# Patient Record
Sex: FEMALE | Race: WHITE | NOT HISPANIC OR LATINO | Employment: OTHER | URBAN - METROPOLITAN AREA
[De-identification: names, ages, dates, MRNs, and addresses within clinical notes are randomized per-mention and may not be internally consistent; named-entity substitution may affect disease eponyms.]

---

## 2023-09-21 LAB — URINE CULTURE: NORMAL

## 2023-11-14 ENCOUNTER — OFFICE VISIT (OUTPATIENT)
Dept: PRIMARY CARE | Facility: CLINIC | Age: 78
End: 2023-11-14
Payer: MEDICARE

## 2023-11-14 VITALS
SYSTOLIC BLOOD PRESSURE: 130 MMHG | DIASTOLIC BLOOD PRESSURE: 54 MMHG | HEIGHT: 62 IN | HEART RATE: 96 BPM | RESPIRATION RATE: 16 BRPM | OXYGEN SATURATION: 98 % | WEIGHT: 135.8 LBS | BODY MASS INDEX: 24.99 KG/M2

## 2023-11-14 DIAGNOSIS — J45.21 MILD INTERMITTENT REACTIVE AIRWAY DISEASE WITH ACUTE EXACERBATION (HHS-HCC): Primary | ICD-10-CM

## 2023-11-14 DIAGNOSIS — J20.8 ACUTE BRONCHITIS DUE TO OTHER SPECIFIED ORGANISMS: ICD-10-CM

## 2023-11-14 PROCEDURE — 99213 OFFICE O/P EST LOW 20 MIN: CPT | Performed by: INTERNAL MEDICINE

## 2023-11-14 PROCEDURE — 1159F MED LIST DOCD IN RCRD: CPT | Performed by: INTERNAL MEDICINE

## 2023-11-14 PROCEDURE — 1125F AMNT PAIN NOTED PAIN PRSNT: CPT | Performed by: INTERNAL MEDICINE

## 2023-11-14 RX ORDER — DOXYCYCLINE 100 MG/1
CAPSULE ORAL
Qty: 20 CAPSULE | Refills: 0 | Status: SHIPPED | OUTPATIENT
Start: 2023-11-14 | End: 2024-01-15 | Stop reason: ALTCHOICE

## 2023-11-14 RX ORDER — GABAPENTIN 100 MG/1
1 CAPSULE ORAL DAILY PRN
COMMUNITY
Start: 2018-06-04 | End: 2024-04-16

## 2023-11-14 RX ORDER — FLUTICASONE PROPIONATE AND SALMETEROL 250; 50 UG/1; UG/1
1 POWDER RESPIRATORY (INHALATION)
Qty: 60 EACH | Refills: 11 | Status: SHIPPED | OUTPATIENT
Start: 2023-11-14 | End: 2024-05-21 | Stop reason: ALTCHOICE

## 2023-11-14 RX ORDER — VIT C/E/ZN/COPPR/LUTEIN/ZEAXAN 250MG-90MG
1000 CAPSULE ORAL
COMMUNITY

## 2023-11-14 RX ORDER — AMLODIPINE BESYLATE 2.5 MG/1
TABLET ORAL
COMMUNITY
Start: 2019-07-01 | End: 2024-03-28

## 2023-11-14 ASSESSMENT — PATIENT HEALTH QUESTIONNAIRE - PHQ9
2. FEELING DOWN, DEPRESSED OR HOPELESS: NOT AT ALL
1. LITTLE INTEREST OR PLEASURE IN DOING THINGS: NOT AT ALL
SUM OF ALL RESPONSES TO PHQ9 QUESTIONS 1 AND 2: 0

## 2023-11-14 ASSESSMENT — ENCOUNTER SYMPTOMS
TROUBLE SWALLOWING: 0
COUGH: 1
NAUSEA: 0
CHILLS: 0
VOMITING: 0
RHINORRHEA: 1
FATIGUE: 0
DEPRESSION: 0
VOICE CHANGE: 1
EYE PAIN: 0
DIARRHEA: 0
ABDOMINAL PAIN: 0
LOSS OF SENSATION IN FEET: 0
FEVER: 0
EYE DISCHARGE: 0
SORE THROAT: 1
SHORTNESS OF BREATH: 1
CONSTIPATION: 0
SINUS PAIN: 1
OCCASIONAL FEELINGS OF UNSTEADINESS: 0

## 2023-11-14 ASSESSMENT — COLUMBIA-SUICIDE SEVERITY RATING SCALE - C-SSRS
6. HAVE YOU EVER DONE ANYTHING, STARTED TO DO ANYTHING, OR PREPARED TO DO ANYTHING TO END YOUR LIFE?: NO
1. IN THE PAST MONTH, HAVE YOU WISHED YOU WERE DEAD OR WISHED YOU COULD GO TO SLEEP AND NOT WAKE UP?: NO
2. HAVE YOU ACTUALLY HAD ANY THOUGHTS OF KILLING YOURSELF?: NO

## 2023-11-14 NOTE — PROGRESS NOTES
"Subjective   Patient ID: Donna Vidales is a 78 y.o. female who presents for Nasal Congestion and Cough.    Cough  Associated symptoms include postnasal drip, rhinorrhea, a sore throat and shortness of breath. Pertinent negatives include no chest pain, chills, ear pain or fever.      #Nasal congestion and cough  - Cough and runny nose started two weeks ago, has not stopped since then  - Green mucus and productive cough  - Tested herself for COVID-19 two weeks ago and it was negative, did not test again  - Has not taken medication for relief as pt does not know what to take  - Has not done steam inhalation for relief  - Chamomile tea she drinks at night, does not help  - Denies sick contacts, no recent travel  - Has not gotten the recent COVID booster or flu vaccine  - Pt has been living with grandson's dog for past three months  - Pt also has been raking leaves recently    Review of Systems   Constitutional:  Negative for chills, fatigue and fever.   HENT:  Positive for congestion, postnasal drip, rhinorrhea, sinus pain, sore throat and voice change. Negative for ear pain and trouble swallowing.    Eyes:  Negative for pain and discharge.   Respiratory:  Positive for cough and shortness of breath.    Cardiovascular:  Negative for chest pain.   Gastrointestinal:  Negative for abdominal pain, constipation, diarrhea, nausea and vomiting.       Objective   /54 (BP Location: Right arm, Patient Position: Sitting, BP Cuff Size: Adult)   Pulse 96   Resp 16   Ht 1.575 m (5' 2\")   Wt 61.6 kg (135 lb 12.9 oz)   SpO2 98%   BMI 24.84 kg/m²     Physical Exam  General- non toxic, no respiratory distress  Eyes- Conjunctiva clear, PERRLA  Ears- TMs clear, no erythema, no fluid, TMs not retracted or bulging  Nose- external normal, turbinates free of swelling, erythema, and drainage, sinus tenderness  Throat- mucus membranes moist, erythema present, no sinus tract drainage, no exudates or lesions  Neck- slightly tender, no " enlarged cervical lymphadenopathy  Cardiac- regular rate and rhythm, no murmurs, no gallop or rubs  Lung- prolonged end expiratory phase, no rales, no rhonchi, no wheezing  GI- normally active bowel sounds, nontender, nondistended, no organomegaly, no guarding  skin no new lesions  Extremities- no edema  Neuro- non focal, oriented x 3  Psychiatric- no hallucinations    Assessment/Plan   Donna Vidales is a 78 y.o. female who presents for Nasal Congestion and Cough. Of note, the pt was recently seen in clinic in September 2023 for similar symptoms and was diagnosed with acute bronchitis and mild intermittent reactive airway disease. At this point, she was prescribed SMX-TMP, which she reports taking the full course of. She also was also started on fluticasone propion-salmeterol 250-50mcg. She reports that she does not know how to use it, so she has not been using it. Plan is as follows:    #Mild intermittent reactive airway disease with acute exacerbation  - Potential triggers include new household pet dog and raking leaves  - Pt to use fluticasone propion-Salmeterol 250-50 mcg scheduled with 1 puff 2x daily    #Acute Bronchitis  - Doxycycline 10 mg for 10 days BID    Please follow up in the next week if SOB does not improve.    Problem List Items Addressed This Visit    None  Visit Diagnoses         Codes    Mild intermittent reactive airway disease with acute exacerbation    -  Primary J45.21    Relevant Medications    fluticasone propion-salmeteroL (Advair Diskus) 250-50 mcg/dose diskus inhaler    Acute bronchitis due to other specified organisms     J20.8    Relevant Medications    doxycycline (Vibramycin) 100 mg capsule            Jaquelin Jordan, MS3  [employer] [facility]

## 2023-11-14 NOTE — PATIENT INSTRUCTIONS
Please return to the office in one week from today if you still feel shortness of breath. Complete flu shot when you finish your medications.

## 2023-11-20 ENCOUNTER — TELEPHONE (OUTPATIENT)
Dept: PRIMARY CARE | Facility: CLINIC | Age: 78
End: 2023-11-20
Payer: MEDICARE

## 2023-11-20 NOTE — TELEPHONE ENCOUNTER
Went over with pt     ----- Message from Brianna Arguello MD sent at 11/20/2023  2:11 PM EST -----  Since she has remote hx of intolernace to steroid I did not sent medrol pack. Just call her, tell her to continue current meds, also use face steam and claritin d or allegra d for 1 week and observe how she is feeling next week  ----- Message -----  From: Eloise Hilliard MA  Sent: 11/20/2023   2:07 PM EST  To: Brianna Arguello MD    ? Allergy   ----- Message -----  From: Brianna Arguello MD  Sent: 11/20/2023   1:08 PM EST  To: Eloise Hilliard MA    Unless she has intolerance to doxicicline she should finish, but I also want her to add medrol pack that I sent to pharmacy, and continue oral inhaler . She needs to give herselft at least one more week to feel improvement  ----- Message -----  From: Eloise Hilliard MA  Sent: 11/20/2023  11:58 AM EST  To: Brianna Arguello MD    Pt states that she is still having very bad sinus issues and headaches she is taking the the other medication but it is not helping would like something else

## 2023-12-20 ENCOUNTER — APPOINTMENT (OUTPATIENT)
Dept: PRIMARY CARE | Facility: CLINIC | Age: 78
End: 2023-12-20
Payer: MEDICARE

## 2023-12-21 ENCOUNTER — LAB REQUISITION (OUTPATIENT)
Dept: LAB | Facility: HOSPITAL | Age: 78
End: 2023-12-21
Payer: MEDICARE

## 2023-12-21 DIAGNOSIS — J45.998 OTHER ASTHMA (HHS-HCC): ICD-10-CM

## 2023-12-21 PROCEDURE — 87651 STREP A DNA AMP PROBE: CPT

## 2023-12-22 LAB — S PYO DNA THROAT QL NAA+PROBE: NOT DETECTED

## 2024-01-03 ENCOUNTER — APPOINTMENT (OUTPATIENT)
Dept: PRIMARY CARE | Facility: CLINIC | Age: 79
End: 2024-01-03
Payer: MEDICARE

## 2024-01-15 ENCOUNTER — LAB (OUTPATIENT)
Dept: LAB | Facility: LAB | Age: 79
End: 2024-01-15
Payer: MEDICARE

## 2024-01-15 ENCOUNTER — OFFICE VISIT (OUTPATIENT)
Dept: PRIMARY CARE | Facility: CLINIC | Age: 79
End: 2024-01-15
Payer: MEDICARE

## 2024-01-15 VITALS
HEART RATE: 66 BPM | BODY MASS INDEX: 25.58 KG/M2 | WEIGHT: 139 LBS | DIASTOLIC BLOOD PRESSURE: 64 MMHG | RESPIRATION RATE: 15 BRPM | HEIGHT: 62 IN | OXYGEN SATURATION: 99 % | SYSTOLIC BLOOD PRESSURE: 130 MMHG

## 2024-01-15 DIAGNOSIS — Z00.00 WELLNESS EXAMINATION: Primary | ICD-10-CM

## 2024-01-15 DIAGNOSIS — K21.00 GASTROESOPHAGEAL REFLUX DISEASE WITH ESOPHAGITIS WITHOUT HEMORRHAGE: ICD-10-CM

## 2024-01-15 DIAGNOSIS — Z12.31 VISIT FOR SCREENING MAMMOGRAM: ICD-10-CM

## 2024-01-15 DIAGNOSIS — R73.9 HYPERGLYCEMIA: ICD-10-CM

## 2024-01-15 DIAGNOSIS — I10 PRIMARY HYPERTENSION: ICD-10-CM

## 2024-01-15 DIAGNOSIS — Z00.00 PHYSICAL EXAM, ANNUAL: ICD-10-CM

## 2024-01-15 DIAGNOSIS — M48.061 SPINAL STENOSIS OF LUMBAR REGION WITHOUT NEUROGENIC CLAUDICATION: ICD-10-CM

## 2024-01-15 DIAGNOSIS — E55.9 VITAMIN D DEFICIENCY: ICD-10-CM

## 2024-01-15 LAB
25(OH)D3 SERPL-MCNC: 28 NG/ML (ref 30–100)
ALBUMIN SERPL BCP-MCNC: 4.5 G/DL (ref 3.4–5)
ALP SERPL-CCNC: 59 U/L (ref 33–136)
ALT SERPL W P-5'-P-CCNC: 19 U/L (ref 7–45)
ANION GAP SERPL CALC-SCNC: 12 MMOL/L (ref 10–20)
AST SERPL W P-5'-P-CCNC: 20 U/L (ref 9–39)
BILIRUB SERPL-MCNC: 0.7 MG/DL (ref 0–1.2)
BUN SERPL-MCNC: 10 MG/DL (ref 6–23)
CALCIUM SERPL-MCNC: 9.5 MG/DL (ref 8.6–10.6)
CHLORIDE SERPL-SCNC: 103 MMOL/L (ref 98–107)
CHOLEST SERPL-MCNC: 181 MG/DL (ref 0–199)
CHOLESTEROL/HDL RATIO: 2.7
CO2 SERPL-SCNC: 30 MMOL/L (ref 21–32)
CREAT SERPL-MCNC: 0.6 MG/DL (ref 0.5–1.05)
EGFRCR SERPLBLD CKD-EPI 2021: >90 ML/MIN/1.73M*2
ERYTHROCYTE [DISTWIDTH] IN BLOOD BY AUTOMATED COUNT: 13.4 % (ref 11.5–14.5)
EST. AVERAGE GLUCOSE BLD GHB EST-MCNC: 126 MG/DL
GLUCOSE SERPL-MCNC: 109 MG/DL (ref 74–99)
HBA1C MFR BLD: 6 %
HCT VFR BLD AUTO: 39.2 % (ref 36–46)
HDLC SERPL-MCNC: 66.9 MG/DL
HGB BLD-MCNC: 13 G/DL (ref 12–16)
LDLC SERPL CALC-MCNC: 86 MG/DL
MCH RBC QN AUTO: 28.6 PG (ref 26–34)
MCHC RBC AUTO-ENTMCNC: 33.2 G/DL (ref 32–36)
MCV RBC AUTO: 86 FL (ref 80–100)
NON HDL CHOLESTEROL: 114 MG/DL (ref 0–149)
NRBC BLD-RTO: 0 /100 WBCS (ref 0–0)
PLATELET # BLD AUTO: 308 X10*3/UL (ref 150–450)
POTASSIUM SERPL-SCNC: 4.2 MMOL/L (ref 3.5–5.3)
PROT SERPL-MCNC: 6.9 G/DL (ref 6.4–8.2)
RBC # BLD AUTO: 4.54 X10*6/UL (ref 4–5.2)
SODIUM SERPL-SCNC: 141 MMOL/L (ref 136–145)
TRIGL SERPL-MCNC: 143 MG/DL (ref 0–149)
TSH SERPL-ACNC: 1.28 MIU/L (ref 0.44–3.98)
VLDL: 29 MG/DL (ref 0–40)
WBC # BLD AUTO: 5.3 X10*3/UL (ref 4.4–11.3)

## 2024-01-15 PROCEDURE — 80053 COMPREHEN METABOLIC PANEL: CPT

## 2024-01-15 PROCEDURE — G0439 PPPS, SUBSEQ VISIT: HCPCS | Performed by: INTERNAL MEDICINE

## 2024-01-15 PROCEDURE — 1125F AMNT PAIN NOTED PAIN PRSNT: CPT | Performed by: INTERNAL MEDICINE

## 2024-01-15 PROCEDURE — 80061 LIPID PANEL: CPT

## 2024-01-15 PROCEDURE — 82306 VITAMIN D 25 HYDROXY: CPT

## 2024-01-15 PROCEDURE — 83036 HEMOGLOBIN GLYCOSYLATED A1C: CPT

## 2024-01-15 PROCEDURE — 1170F FXNL STATUS ASSESSED: CPT | Performed by: INTERNAL MEDICINE

## 2024-01-15 PROCEDURE — 99397 PER PM REEVAL EST PAT 65+ YR: CPT | Performed by: INTERNAL MEDICINE

## 2024-01-15 PROCEDURE — 3075F SYST BP GE 130 - 139MM HG: CPT | Performed by: INTERNAL MEDICINE

## 2024-01-15 PROCEDURE — 84443 ASSAY THYROID STIM HORMONE: CPT

## 2024-01-15 PROCEDURE — 3078F DIAST BP <80 MM HG: CPT | Performed by: INTERNAL MEDICINE

## 2024-01-15 PROCEDURE — 36415 COLL VENOUS BLD VENIPUNCTURE: CPT

## 2024-01-15 PROCEDURE — 85027 COMPLETE CBC AUTOMATED: CPT

## 2024-01-15 RX ORDER — ALBUTEROL SULFATE 90 UG/1
2 AEROSOL, METERED RESPIRATORY (INHALATION) 4 TIMES DAILY PRN
COMMUNITY
Start: 2023-12-21

## 2024-01-15 RX ORDER — IBUPROFEN 100 MG/5ML
1000 SUSPENSION, ORAL (FINAL DOSE FORM) ORAL DAILY
COMMUNITY

## 2024-01-15 ASSESSMENT — ACTIVITIES OF DAILY LIVING (ADL)
TAKING_MEDICATION: INDEPENDENT
BATHING: INDEPENDENT
MANAGING_FINANCES: INDEPENDENT
DOING_HOUSEWORK: INDEPENDENT
DRESSING: INDEPENDENT
GROCERY_SHOPPING: INDEPENDENT

## 2024-01-15 ASSESSMENT — COLUMBIA-SUICIDE SEVERITY RATING SCALE - C-SSRS
1. IN THE PAST MONTH, HAVE YOU WISHED YOU WERE DEAD OR WISHED YOU COULD GO TO SLEEP AND NOT WAKE UP?: NO
2. HAVE YOU ACTUALLY HAD ANY THOUGHTS OF KILLING YOURSELF?: NO
6. HAVE YOU EVER DONE ANYTHING, STARTED TO DO ANYTHING, OR PREPARED TO DO ANYTHING TO END YOUR LIFE?: NO

## 2024-01-15 ASSESSMENT — ENCOUNTER SYMPTOMS
CHEST TIGHTNESS: 0
PALPITATIONS: 0
SLEEP DISTURBANCE: 0
DEPRESSION: 0
OCCASIONAL FEELINGS OF UNSTEADINESS: 0
LOSS OF SENSATION IN FEET: 0
NERVOUS/ANXIOUS: 0
LIGHT-HEADEDNESS: 0

## 2024-01-15 NOTE — PROGRESS NOTES
"Subjective   Reason for Visit: Donna Vidales is an 79 y.o. female here for a Medicare Wellness visit.               HPI Pt recovered from  acute bronchitis, after 2 rounds of atb and 2 rounds of steroids oral . She is not back to normal.  Two large meals for breakfast  toast and coffee, and dinner : any kind of protein starches,  and fresh produce , for lunch one egg or vegetables, cheese or pizza. 3 servings of fresh produce. Sweets small amount daily. ETOH home wine every night..  She is doing home physical therapy for knees and lower spine, her bilateral calf pain, and it works. Knee pain worse at night, uses local voltaren or oral advil. PRN , as well gabapentin 100 at bedtime.  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  Heartburn asymptomatic with no meds.    Patient Care Team:  Brianna Arguello MD as PCP - General  Brianna Arguello MD as PCP - [employer] PCP     Review of Systems   HENT:          Rhinorrea , triggered by weather chagnes   Respiratory:  Negative for chest tightness.    Cardiovascular:  Negative for palpitations and leg swelling.   Neurological:  Negative for light-headedness.   Psychiatric/Behavioral:  Negative for sleep disturbance. The patient is not nervous/anxious.    All other systems reviewed and are negative.      Objective   Vitals:  /64 (BP Location: Left arm, Patient Position: Sitting, BP Cuff Size: Adult)   Pulse 66   Resp 15   Ht 1.575 m (5' 2\")   Wt 63 kg (139 lb)   SpO2 99%   BMI 25.42 kg/m²       Physical Exam  Eyes - conjunctiva clear, PERRLA  HEENT - no impacted wax. Minimal sinus tenderness.  Neck - No cervical lymphadenopathy, no thyromegaly  Axilla - no palpable lymphadenopathy  Breast - visual exam: no asymmetry; palpation: nontender, no palpable nodules, retractions or discharge bilaterally  Cardiac - regular rate and rhythm, no murmurs, no carotid bruit, no JVP  Lung- clear to " auscultation, no rales, no rhonchi, no wheezing  GI- normally active bowel sounds, nontender, nondistended, no hepatosplenomegaly, no rebound  MSK - no deformities, bilateral knee mild effusion, ROM preserve  Neuro - non focal, oriented x 3  Extremities - no edema, multiple superficial varicose veins in proximal and distal lower extremities, good distal arterial pulses  Skin - no rash  Psychiatric - pleasant, cooperative, no hallucinations    Assessment/Plan   Problem List Items Addressed This Visit          Cardiac and Vasculature    Primary hypertension    Relevant Orders    Comprehensive Metabolic Panel    Lipid Panel       Health Encounters    Wellness examination - Primary    Physical exam, annual     Other Visit Diagnoses       Age related osteoporosis, unspecified pathological fracture presence        Hyperglycemia        Relevant Orders    Hemoglobin A1C    TSH with reflex to Free T4 if abnormal    Gastroesophageal reflux disease with esophagitis without hemorrhage        Relevant Orders    CBC    Vitamin D deficiency        Relevant Orders    Vitamin D 25-Hydroxy,Total (for eval of Vitamin D levels)    Essential (primary) hypertension        Relevant Orders    TSH with reflex to Free T4 if abnormal

## 2024-01-15 NOTE — ASSESSMENT & PLAN NOTE
Discussed diet, exercise, immunizations, and screening appropriate for their age.  Colonoscopy: 2022 no longer  Dexa: borderline normal low.  Mammogram: 2021.

## 2024-01-15 NOTE — PATIENT INSTRUCTIONS
Please continue healthy life style. You may increase gabapentin to  200 mg if pain in joints and legs do not improve in [place].  Complete soon shingrex #1 and 2 months later shingrex #2.Maintain some weight bearing exercise . Return here in May. Complete soon mammogram.

## 2024-01-23 ENCOUNTER — TELEPHONE (OUTPATIENT)
Dept: PRIMARY CARE | Facility: CLINIC | Age: 79
End: 2024-01-23
Payer: MEDICARE

## 2024-03-28 DIAGNOSIS — I10 PRIMARY HYPERTENSION: ICD-10-CM

## 2024-03-28 RX ORDER — AMLODIPINE BESYLATE 2.5 MG/1
TABLET ORAL
Qty: 160 TABLET | Refills: 1 | Status: SHIPPED | OUTPATIENT
Start: 2024-03-28 | End: 2024-05-06 | Stop reason: SDUPTHER

## 2024-04-15 DIAGNOSIS — M48.061 SPINAL STENOSIS OF LUMBAR REGION WITHOUT NEUROGENIC CLAUDICATION: Primary | ICD-10-CM

## 2024-04-16 RX ORDER — GABAPENTIN 100 MG/1
CAPSULE ORAL
Qty: 80 CAPSULE | Refills: 0 | Status: SHIPPED | OUTPATIENT
Start: 2024-04-16

## 2024-05-06 DIAGNOSIS — I10 PRIMARY HYPERTENSION: ICD-10-CM

## 2024-05-06 RX ORDER — AMLODIPINE BESYLATE 2.5 MG/1
TABLET ORAL
Qty: 180 TABLET | Refills: 1 | Status: SHIPPED | OUTPATIENT
Start: 2024-05-06

## 2024-05-21 ENCOUNTER — OFFICE VISIT (OUTPATIENT)
Dept: PRIMARY CARE | Facility: CLINIC | Age: 79
End: 2024-05-21
Payer: MEDICARE

## 2024-05-21 VITALS
WEIGHT: 137.13 LBS | SYSTOLIC BLOOD PRESSURE: 134 MMHG | OXYGEN SATURATION: 97 % | BODY MASS INDEX: 25.23 KG/M2 | DIASTOLIC BLOOD PRESSURE: 72 MMHG | RESPIRATION RATE: 16 BRPM | HEIGHT: 62 IN | HEART RATE: 74 BPM | TEMPERATURE: 97.5 F

## 2024-05-21 DIAGNOSIS — I10 PRIMARY HYPERTENSION: Primary | ICD-10-CM

## 2024-05-21 DIAGNOSIS — E55.9 VITAMIN D DEFICIENCY, UNSPECIFIED: ICD-10-CM

## 2024-05-21 DIAGNOSIS — M25.50 DIFFUSE ARTHRALGIA: ICD-10-CM

## 2024-05-21 PROCEDURE — 3078F DIAST BP <80 MM HG: CPT | Performed by: INTERNAL MEDICINE

## 2024-05-21 PROCEDURE — 99213 OFFICE O/P EST LOW 20 MIN: CPT | Performed by: INTERNAL MEDICINE

## 2024-05-21 PROCEDURE — 3075F SYST BP GE 130 - 139MM HG: CPT | Performed by: INTERNAL MEDICINE

## 2024-05-21 PROCEDURE — 1036F TOBACCO NON-USER: CPT | Performed by: INTERNAL MEDICINE

## 2024-05-21 ASSESSMENT — ENCOUNTER SYMPTOMS
CHILLS: 0
SINUS PAIN: 0
FEVER: 0
DEPRESSION: 0
LOSS OF SENSATION IN FEET: 0
OCCASIONAL FEELINGS OF UNSTEADINESS: 0

## 2024-05-21 ASSESSMENT — PATIENT HEALTH QUESTIONNAIRE - PHQ9
2. FEELING DOWN, DEPRESSED OR HOPELESS: NOT AT ALL
SUM OF ALL RESPONSES TO PHQ9 QUESTIONS 1 AND 2: 0
1. LITTLE INTEREST OR PLEASURE IN DOING THINGS: NOT AT ALL

## 2024-05-21 ASSESSMENT — COLUMBIA-SUICIDE SEVERITY RATING SCALE - C-SSRS
2. HAVE YOU ACTUALLY HAD ANY THOUGHTS OF KILLING YOURSELF?: NO
1. IN THE PAST MONTH, HAVE YOU WISHED YOU WERE DEAD OR WISHED YOU COULD GO TO SLEEP AND NOT WAKE UP?: NO
6. HAVE YOU EVER DONE ANYTHING, STARTED TO DO ANYTHING, OR PREPARED TO DO ANYTHING TO END YOUR LIFE?: NO

## 2024-05-21 NOTE — PROGRESS NOTES
"Subjective   Patient ID: Donna Vidales is a 79 y.o. female who presents for Hypertension. Aches  Lab results from [place]     She return 3 w ago from [place], her life style continues similar. She did not increase gabapentin in [place] due to dry mouth, only takes 100 mg capsule.   For pain uses PRN acetaminophen or aleeve, when humidity is high.  Good compliance with bp meds. At home god numbers systolics 120 ' diastolics 60.  She went to urgent care in [place]iness in [place], head ct completed still pending.   She was prescribed on April ,for sinus infection,  Augmentin, unable to tolerate due to upset stomach,, no rash,   She increased sporadically her vitamin d    Review of Systems   Constitutional:  Negative for chills and fever.   HENT:  Negative for ear pain and sinus pain.    All other systems reviewed and are negative.      Objective   /72 (BP Location: Left arm, Patient Position: Sitting, BP Cuff Size: Adult)   Pulse 74   Temp 36.4 °C (97.5 °F)   Resp 16   Ht 1.575 m (5' 2\")   Wt 62.2 kg (137 lb 2 oz)   SpO2 97%   BMI 25.08 kg/m²     Physical Exam  Eyes- Conjunctiva clear  Neck-no thyromegaly  Cardiac- regular rate and rhythm, no murmurs  Lung- clear to auscultation  GI- present  bowel sounds, nontender, no rebound  MSK- no deformities  Extremities- no edema, good distal pulses  Neuro- non focal, oriented x 3  Psychiatric- pleasant, well groomed, no hallucinations    Assessment/Plan   Problem List Items Addressed This Visit             ICD-10-CM    Primary hypertension - Primary I10     Well control         Vitamin D deficiency, unspecified E55.9     Advice to take daily dose of d,          Diffuse arthralgia M25.50     Discussed pain control               "

## 2024-05-21 NOTE — PATIENT INSTRUCTIONS
Maintain 2 capsules of vitamiin d3. Take daily acetaminophen is safe in emtpy stomach, you may take advil with food as need it, return in october

## 2024-07-14 DIAGNOSIS — I10 PRIMARY HYPERTENSION: ICD-10-CM

## 2024-07-15 RX ORDER — AMLODIPINE BESYLATE 2.5 MG/1
TABLET ORAL
Qty: 200 TABLET | Refills: 1 | Status: SHIPPED | OUTPATIENT
Start: 2024-07-15

## 2024-08-28 ENCOUNTER — TELEPHONE (OUTPATIENT)
Dept: PRIMARY CARE | Facility: CLINIC | Age: 79
End: 2024-08-28
Payer: MEDICARE

## 2024-08-29 ENCOUNTER — OFFICE VISIT (OUTPATIENT)
Dept: PRIMARY CARE | Facility: CLINIC | Age: 79
End: 2024-08-29
Payer: MEDICARE

## 2024-08-29 VITALS
RESPIRATION RATE: 18 BRPM | WEIGHT: 136 LBS | HEART RATE: 73 BPM | HEIGHT: 62 IN | DIASTOLIC BLOOD PRESSURE: 72 MMHG | SYSTOLIC BLOOD PRESSURE: 124 MMHG | OXYGEN SATURATION: 96 % | BODY MASS INDEX: 25.03 KG/M2

## 2024-08-29 DIAGNOSIS — C67.5 MALIGNANT NEOPLASM OF URINARY BLADDER NECK (MULTI): ICD-10-CM

## 2024-08-29 DIAGNOSIS — F41.8 DEPRESSION WITH ANXIETY: Primary | ICD-10-CM

## 2024-08-29 DIAGNOSIS — M48.061 SPINAL STENOSIS OF LUMBAR REGION WITHOUT NEUROGENIC CLAUDICATION: ICD-10-CM

## 2024-08-29 DIAGNOSIS — I10 PRIMARY HYPERTENSION: ICD-10-CM

## 2024-08-29 DIAGNOSIS — G47.01 INSOMNIA DUE TO MEDICAL CONDITION: ICD-10-CM

## 2024-08-29 DIAGNOSIS — M15.9 PRIMARY OSTEOARTHRITIS INVOLVING MULTIPLE JOINTS: ICD-10-CM

## 2024-08-29 PROBLEM — M15.0 PRIMARY OSTEOARTHRITIS INVOLVING MULTIPLE JOINTS: Status: ACTIVE | Noted: 2024-08-29

## 2024-08-29 PROCEDURE — 1036F TOBACCO NON-USER: CPT | Performed by: INTERNAL MEDICINE

## 2024-08-29 PROCEDURE — 3074F SYST BP LT 130 MM HG: CPT | Performed by: INTERNAL MEDICINE

## 2024-08-29 PROCEDURE — 99214 OFFICE O/P EST MOD 30 MIN: CPT | Performed by: INTERNAL MEDICINE

## 2024-08-29 PROCEDURE — 3078F DIAST BP <80 MM HG: CPT | Performed by: INTERNAL MEDICINE

## 2024-08-29 PROCEDURE — 1159F MED LIST DOCD IN RCRD: CPT | Performed by: INTERNAL MEDICINE

## 2024-08-29 RX ORDER — ASCORBIC ACID 125 MG
250 TABLET,CHEWABLE ORAL NIGHTLY
COMMUNITY

## 2024-08-29 RX ORDER — DULOXETIN HYDROCHLORIDE 20 MG/1
CAPSULE, DELAYED RELEASE ORAL
Qty: 30 CAPSULE | Refills: 1 | Status: SHIPPED | OUTPATIENT
Start: 2024-08-29

## 2024-08-29 ASSESSMENT — ENCOUNTER SYMPTOMS
ABDOMINAL DISTENTION: 0
DIAPHORESIS: 0

## 2024-08-29 NOTE — PATIENT INSTRUCTIONS
Please take one tylenol artritis every morning along with new medication duloxetine 20 mg and am as well. . Night time take one tylenol PM along with one gabapentin and one magnesium.Re start lower back physical therapy at home and continue walking more frequent. Return in 1 m

## 2024-08-29 NOTE — PROGRESS NOTES
"Subjective   Patient ID: Donna Vidales is a 79 y.o. female who presents for Arthritis (Arthritis pain).     She feels exacerbation of intense pain and stiffness in hands, knees, lower back for about 1 month during the night mostly after resting for few hours, she needs to get up and walk, heats and hot showers help, She is very fatigue and tired during the day due to lack of sleep.   She eats because she has too.    She takes PRN acetaminophen 650 mg at bedtime. Day time she takes none.   has lost his sights, she is very stress since she is doing all her house hold care, drives  , prepare their meds .     Review of Systems   Constitutional:  Negative for diaphoresis.   Gastrointestinal:  Negative for abdominal distention.   Neurological:         Concern for short memory lapses, re eval next month   All other systems reviewed and are negative.      Objective   /72 (BP Location: Right arm, Patient Position: Sitting)   Pulse 73   Resp 18   Ht 1.575 m (5' 2\")   Wt 61.7 kg (136 lb)   SpO2 96%   BMI 24.87 kg/m²     Physical Exam  Weight stable  Eyes- Conjunctiva clear  Neck-no thyromegaly  Cardiac- regular rate and rhythm, no murmurs  Lung- clear to auscultation  GI- present  bowel sounds, nontender, no rebound  MSK- no deformities  Extremities- no edema, good distal pulses  Neuro- non focal, oriented x 3  Psychiatric- pleasant, well groomed, no hallucinations    Assessment/Plan   Problem List Items Addressed This Visit             ICD-10-CM    Spinal stenosis of lumbar region without neurogenic claudication M48.061     Advice home P therapy         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 Next visit may increase gabapentin if necessary.

## 2024-08-29 NOTE — ASSESSMENT & PLAN NOTE
Discussed pain control, she wants to avoid nsaids due to her hx of GERD. Next visit may increase gabapentin if necessary.

## 2024-08-29 NOTE — ASSESSMENT & PLAN NOTE
Care giver stress, chronic pain, severe insomnia, discussed side effects and benefits of duloxetine re dwayne in 1 m

## 2024-09-16 ENCOUNTER — TELEPHONE (OUTPATIENT)
Dept: PRIMARY CARE | Facility: CLINIC | Age: 79
End: 2024-09-16
Payer: MEDICARE

## 2024-09-17 ENCOUNTER — OFFICE VISIT (OUTPATIENT)
Dept: PRIMARY CARE | Facility: CLINIC | Age: 79
End: 2024-09-17
Payer: MEDICARE

## 2024-09-17 VITALS
RESPIRATION RATE: 16 BRPM | HEART RATE: 73 BPM | DIASTOLIC BLOOD PRESSURE: 64 MMHG | WEIGHT: 137.13 LBS | HEIGHT: 62 IN | TEMPERATURE: 96.6 F | OXYGEN SATURATION: 99 % | BODY MASS INDEX: 25.23 KG/M2 | SYSTOLIC BLOOD PRESSURE: 108 MMHG

## 2024-09-17 DIAGNOSIS — K11.20 PAROTIDITIS: Primary | ICD-10-CM

## 2024-09-17 DIAGNOSIS — I10 PRIMARY HYPERTENSION: ICD-10-CM

## 2024-09-17 PROCEDURE — 1036F TOBACCO NON-USER: CPT | Performed by: INTERNAL MEDICINE

## 2024-09-17 PROCEDURE — 99213 OFFICE O/P EST LOW 20 MIN: CPT | Performed by: INTERNAL MEDICINE

## 2024-09-17 PROCEDURE — 3078F DIAST BP <80 MM HG: CPT | Performed by: INTERNAL MEDICINE

## 2024-09-17 PROCEDURE — 3074F SYST BP LT 130 MM HG: CPT | Performed by: INTERNAL MEDICINE

## 2024-09-17 PROCEDURE — 1159F MED LIST DOCD IN RCRD: CPT | Performed by: INTERNAL MEDICINE

## 2024-09-17 RX ORDER — DOXYCYCLINE 100 MG/1
100 CAPSULE ORAL 2 TIMES DAILY
Qty: 14 CAPSULE | Refills: 0 | Status: SHIPPED | OUTPATIENT
Start: 2024-09-17 | End: 2024-09-24

## 2024-09-17 ASSESSMENT — PATIENT HEALTH QUESTIONNAIRE - PHQ9
SUM OF ALL RESPONSES TO PHQ9 QUESTIONS 1 AND 2: 0
2. FEELING DOWN, DEPRESSED OR HOPELESS: NOT AT ALL
1. LITTLE INTEREST OR PLEASURE IN DOING THINGS: NOT AT ALL

## 2024-09-17 ASSESSMENT — ENCOUNTER SYMPTOMS
LOSS OF SENSATION IN FEET: 0
DEPRESSION: 0
HEMATURIA: 0
FEVER: 0
OCCASIONAL FEELINGS OF UNSTEADINESS: 0
DYSURIA: 0

## 2024-09-17 ASSESSMENT — COLUMBIA-SUICIDE SEVERITY RATING SCALE - C-SSRS
6. HAVE YOU EVER DONE ANYTHING, STARTED TO DO ANYTHING, OR PREPARED TO DO ANYTHING TO END YOUR LIFE?: NO
2. HAVE YOU ACTUALLY HAD ANY THOUGHTS OF KILLING YOURSELF?: NO
1. IN THE PAST MONTH, HAVE YOU WISHED YOU WERE DEAD OR WISHED YOU COULD GO TO SLEEP AND NOT WAKE UP?: NO

## 2024-09-17 NOTE — PROGRESS NOTES
"Subjective   Patient ID: Donna Vidales is a 79 y.o. female who presents for body aches.    HPI 2 weeks ago she started with sore troat, chills, diaphoresis for about 1 week, about the same time that she started duloxetine 20 mg. Subsequent she started nausea, semiform stools that now resolved , however she still feels very tired and body aches in elbows toes. Felt left side enlarged tender warm pre auricular area about 10 days after onset of symptoms  Sinus pressure without headache.  She is following instructions of taking acetaminophen 500 q am and tylenol pm at bed time.     Review of Systems   Constitutional:  Negative for fever.   Genitourinary:  Negative for dysuria and hematuria.   All other systems reviewed and are negative.      Objective   /64 (BP Location: Left arm, Patient Position: Sitting, BP Cuff Size: Adult)   Pulse 73   Temp 35.9 °C (96.6 °F)   Resp 16   Ht 1.575 m (5' 2\")   Wt 62.2 kg (137 lb 2 oz)   SpO2 99%   BMI 25.08 kg/m²     Physical Exam  General- non toxic, no respiratory distress  Eyes- Conjunctiva clear  Ears- TMs clear, no erythema, no fluid, TMs not retracted or bulging  Nose- external normal, turbinates free of swelling, erythema, and drainage, no sinus tenderness  Tender enlarged parotid on left side , with mild erythema of skin  Throat- mucus membranes moist, no erythema,  no exudates or lesions  Neck- nontender, no enlarged cervical lymphadenopathy  Cardiac- regular rate and rhythm.  Lung- clear to auscultation, no rales, no rhonchi, no wheezing  GI-  nontender, nondistended.  skin no new lesions  Neuro- non focal, oriented x 3  Psychiatric- no hallucinations    Assessment/Plan   Problem List Items Addressed This Visit             ICD-10-CM    Primary hypertension I10     Well control         Parotiditis - Primary K11.20     Advice sour drinks and foods, start antibiotic, local heat/cold, follow with ENT PRN         Relevant Medications    doxycycline (Vibramycin) 100 mg " capsule

## 2024-09-17 NOTE — PATIENT INSTRUCTIONS
Hold duloxetine until you finish antibiotic, take antibiotic with food always,return here in 2 weeks as suyapa. Continue other medications the same. Discuss also with  your dentist

## 2024-09-19 DIAGNOSIS — M48.061 SPINAL STENOSIS OF LUMBAR REGION WITHOUT NEUROGENIC CLAUDICATION: ICD-10-CM

## 2024-09-19 RX ORDER — GABAPENTIN 100 MG/1
CAPSULE ORAL
Qty: 90 CAPSULE | Refills: 1 | OUTPATIENT
Start: 2024-09-19

## 2024-10-02 ENCOUNTER — APPOINTMENT (OUTPATIENT)
Dept: PRIMARY CARE | Facility: CLINIC | Age: 79
End: 2024-10-02
Payer: MEDICARE

## 2024-10-02 VITALS
WEIGHT: 136.24 LBS | RESPIRATION RATE: 16 BRPM | SYSTOLIC BLOOD PRESSURE: 118 MMHG | BODY MASS INDEX: 25.07 KG/M2 | DIASTOLIC BLOOD PRESSURE: 82 MMHG | TEMPERATURE: 96.5 F | HEART RATE: 66 BPM | OXYGEN SATURATION: 98 % | HEIGHT: 62 IN

## 2024-10-02 DIAGNOSIS — K11.20 PAROTIDITIS: Primary | ICD-10-CM

## 2024-10-02 DIAGNOSIS — I10 PRIMARY HYPERTENSION: ICD-10-CM

## 2024-10-02 DIAGNOSIS — M25.50 DIFFUSE ARTHRALGIA: ICD-10-CM

## 2024-10-02 DIAGNOSIS — R11.0 NAUSEA: ICD-10-CM

## 2024-10-02 PROCEDURE — 1159F MED LIST DOCD IN RCRD: CPT | Performed by: INTERNAL MEDICINE

## 2024-10-02 PROCEDURE — 3079F DIAST BP 80-89 MM HG: CPT | Performed by: INTERNAL MEDICINE

## 2024-10-02 PROCEDURE — 3074F SYST BP LT 130 MM HG: CPT | Performed by: INTERNAL MEDICINE

## 2024-10-02 PROCEDURE — 99214 OFFICE O/P EST MOD 30 MIN: CPT | Performed by: INTERNAL MEDICINE

## 2024-10-02 PROCEDURE — 1036F TOBACCO NON-USER: CPT | Performed by: INTERNAL MEDICINE

## 2024-10-02 RX ORDER — ONDANSETRON 4 MG/1
TABLET, ORALLY DISINTEGRATING ORAL
Qty: 20 TABLET | Refills: 0 | Status: SHIPPED | OUTPATIENT
Start: 2024-10-02

## 2024-10-02 RX ORDER — ONDANSETRON 4 MG/1
4 TABLET, ORALLY DISINTEGRATING ORAL ONCE
Status: CANCELLED | OUTPATIENT
Start: 2024-10-02 | End: 2024-10-02

## 2024-10-02 ASSESSMENT — ENCOUNTER SYMPTOMS
OCCASIONAL FEELINGS OF UNSTEADINESS: 0
HEMATURIA: 0
FEVER: 0
DEPRESSION: 0
CONSTIPATION: 0
LIGHT-HEADEDNESS: 0
ABDOMINAL PAIN: 0
LOSS OF SENSATION IN FEET: 0
CHILLS: 0
DIFFICULTY URINATING: 0
DYSPHORIC MOOD: 0
SORE THROAT: 0
RHINORRHEA: 1
FATIGUE: 1
DIARRHEA: 1
UNEXPECTED WEIGHT CHANGE: 0
BLOOD IN STOOL: 0
DIZZINESS: 0

## 2024-10-02 ASSESSMENT — PATIENT HEALTH QUESTIONNAIRE - PHQ9
1. LITTLE INTEREST OR PLEASURE IN DOING THINGS: NOT AT ALL
2. FEELING DOWN, DEPRESSED OR HOPELESS: NOT AT ALL
SUM OF ALL RESPONSES TO PHQ9 QUESTIONS 1 AND 2: 0

## 2024-10-02 NOTE — PATIENT INSTRUCTIONS
It was a pleasure caring for you this visit!     We are glad that your facial swelling has gone down since we last saw you - but it has not gone away entirely. Jenll in 3 weeks (week of October 21st) if your facial swelling is not improved from today: Tell the  that your parotid gland is swollen and you need an ENT referral.     For your itching, please take Benadryl 25 mg in the morning. If your itching continues for more than 2 weeks please let us know.     We will be restarting your duloxetine - we feel that it was helping your body pains. We will also be sending an anti-nausea (ondansetron) medication to help with the symptoms that you can take as needed, though many patients say that the nausea resolves after a few weeks of being on the duloxetine. Take the Gabapentin 1 hour before bedtime. Make sure you have enough of your medications before you leave on your trip!    We'll see you back in January, or earlier if needed.         Authored by Resident/PA/NP

## 2024-10-02 NOTE — ASSESSMENT & PLAN NOTE
Patient was trialed on duloxetine with subjective improvement, though was unable to tolerate for more than 2 weeks secondary to nausea. Duloxetine re-initiated with addition of ondansetron as needed. Several soft tissue tender points raise some concern for fibromyalgia - gabapentin dose will be reevaluated if not well controlled with duloxetine.

## 2024-10-02 NOTE — PROGRESS NOTES
"Subjective   Patient ID: Donna Vidales is a 79 y.o. female who presents for body pain. And follow up cheek inflammation    HPI She completed 7 days of doxicicline and observed improvement in sie and tenderness of left parotid about 70%.     Patient reports intolerance to duloxetine due to nausea, though endorses subjective improvement in symptoms for the two weeks she was on the medication. Patient is continuing to have diffuse body pain, most notably in her knees, hands, and back. Morning stiffness lasting for 1 hour is most prominent her knees bilaterally and lower back. Patient reports has been the case for decades.     Of note, patient fell last week while dancing with her granddaughter. Had the sensation of her \"knee giving up.\" Did not hit her head, denies bruising secondary to the fall. Patient reports the only fall prior to this was 6 years ago due to black ice.     Hit her head very mildy yesterday - no symptoms \"just a bump\" that resolved with ice. Denies fall as trigger.     Finally, patient reports two days ago had a red ant bite while causing widespread pruritus, rash limited to abdomen. Patient is still somewhat itchy at today's visit but rash has resolved. Pt does report similar urticaria last year with unclear trigger.       Review of Systems   Constitutional:  Positive for fatigue (most prominent in morning). Negative for chills, fever and unexpected weight change.   HENT:  Positive for congestion and rhinorrhea (yellow). Negative for nosebleeds and sore throat.    Gastrointestinal:  Positive for diarrhea (chronic). Negative for abdominal pain, blood in stool and constipation.   Genitourinary:  Negative for difficulty urinating and hematuria.   Neurological:  Negative for dizziness and light-headedness.   Psychiatric/Behavioral:  Negative for dysphoric mood.        Objective   /82 (BP Location: Left arm, Patient Position: Sitting, BP Cuff Size: Adult)   Pulse 66   Temp 35.8 °C (96.5 °F)   " "Resp 16   Ht 1.575 m (5' 2\")   Wt 61.8 kg (136 lb 3.9 oz)   SpO2 98%   BMI 24.92 kg/m²     Physical Exam  Eyes- Conjunctiva clear  HEENT still slight enlargement but no tenderness of left parotid, no visible erythema of the area.  Neck-no thyromegaly  Cardiac- regular rate and rhythm, no murmurs  Lung- clear to auscultation  GI- present  bowel sounds, nontender, no rebound  MSK- no deformities. Scattered tender points: bilateral epicondyles, greater trochanters, medial knees  Extremities- no edema, good distal pulses  Neuro- non focal, oriented x 3  Psychiatric- pleasant, well groomed, no hallucinations   Assessment/Plan   Problem List Items Addressed This Visit             ICD-10-CM    Diffuse arthralgia M25.50     Patient was trialed on duloxetine with subjective improvement, though was unable to tolerate for more than 2 weeks secondary to nausea. Duloxetine re-initiated with addition of ondansetron as needed. Several soft tissue tender points raise some concern for fibromyalgia - gabapentin dose will be reevaluated if not well controlled with duloxetine.         Parotiditis - Primary K11.20     Patient's parotiditis is remarkably improved at this visit, however asymmetry is still appreciable on palpation. Referral to ENT placed, with patient instructions to call the office if swelling is not improved from today's visit in 3 weeks.          Relevant Orders    Referral to ENT     Other Visit Diagnoses         Codes    Nausea     R11.0    Relevant Medications    ondansetron ODT (Zofran-ODT) 4 mg disintegrating tablet               "

## 2024-10-02 NOTE — PROGRESS NOTES
"Subjective   Patient ID: Donna Vidales is a 79 y.o. female who presents for body pain.    HPI     Review of Systems    Objective   /82 (BP Location: Left arm, Patient Position: Sitting, BP Cuff Size: Adult)   Pulse 66   Temp 35.8 °C (96.5 °F)   Resp 16   Ht 1.575 m (5' 2\")   Wt 61.8 kg (136 lb 3.9 oz)   SpO2 98%   BMI 24.92 kg/m²     Physical Exam    Assessment/Plan          "

## 2024-10-02 NOTE — ASSESSMENT & PLAN NOTE
Patient's parotiditis is remarkably improved at this visit, however asymmetry is still appreciable on palpation. Referral to ENT placed, with patient instructions to call the office if swelling is not improved from today's visit in 3 weeks.

## 2024-10-14 DIAGNOSIS — M48.061 SPINAL STENOSIS OF LUMBAR REGION WITHOUT NEUROGENIC CLAUDICATION: ICD-10-CM

## 2024-10-14 RX ORDER — GABAPENTIN 100 MG/1
CAPSULE ORAL
Qty: 180 CAPSULE | Refills: 1 | Status: SHIPPED | OUTPATIENT
Start: 2024-10-14

## 2024-10-31 ENCOUNTER — APPOINTMENT (OUTPATIENT)
Dept: OTOLARYNGOLOGY | Facility: CLINIC | Age: 79
End: 2024-10-31
Payer: MEDICARE

## 2024-11-14 DIAGNOSIS — J45.21 MILD INTERMITTENT REACTIVE AIRWAY DISEASE WITH ACUTE EXACERBATION (HHS-HCC): Primary | ICD-10-CM

## 2024-11-25 RX ORDER — ALBUTEROL SULFATE 90 UG/1
2 INHALANT RESPIRATORY (INHALATION) 4 TIMES DAILY PRN
Qty: 18 G | Refills: 2 | Status: SHIPPED | OUTPATIENT
Start: 2024-11-25

## 2024-11-26 ENCOUNTER — OFFICE VISIT (OUTPATIENT)
Dept: GASTROENTEROLOGY | Facility: HOSPITAL | Age: 79
End: 2024-11-26
Payer: MEDICARE

## 2024-11-26 VITALS — BODY MASS INDEX: 25.03 KG/M2 | HEIGHT: 62 IN | WEIGHT: 136 LBS

## 2024-11-26 DIAGNOSIS — K21.00 GASTROESOPHAGEAL REFLUX DISEASE WITH ESOPHAGITIS WITHOUT HEMORRHAGE: ICD-10-CM

## 2024-11-26 DIAGNOSIS — R10.13 EPIGASTRIC PAIN: Primary | ICD-10-CM

## 2024-11-26 PROCEDURE — 99214 OFFICE O/P EST MOD 30 MIN: CPT | Performed by: INTERNAL MEDICINE

## 2024-11-26 ASSESSMENT — ENCOUNTER SYMPTOMS
EYES NEGATIVE: 1
RESPIRATORY NEGATIVE: 1
CARDIOVASCULAR NEGATIVE: 1
NEUROLOGICAL NEGATIVE: 1
CONSTITUTIONAL NEGATIVE: 1
MUSCULOSKELETAL NEGATIVE: 1
ALLERGIC/IMMUNOLOGIC NEGATIVE: 1
PSYCHIATRIC NEGATIVE: 1
HEMATOLOGIC/LYMPHATIC NEGATIVE: 1
ENDOCRINE NEGATIVE: 1
ABDOMINAL PAIN: 1

## 2024-11-26 NOTE — ASSESSMENT & PLAN NOTE
Patient is an 79-year-old with history of hypertension, chronic arthritis pain, acid reflux, and who has had epigastric discomfort for 5 days.  She ate more of a bland diet and now feels completely better.  She is here with her bag of medicines and wants to know how to proceed in the future.  She denies any dysphagia, black stool or GI bleeding.  She had upper endoscopy showing mild gastritis in 2020.  Her examination is benign.  At this point I recommend she take Prilosec 20 mg daily, Tylenol and or gabapentin for arthritis.  She should minimize NSAIDs and I do not see other need for other intervention today such as blood work or imaging.  If she has recurrent symptoms we may consider blood work and/or repeat endoscopy.  All her questions are answered and she is comfortable with this approach.

## 2024-11-26 NOTE — PROGRESS NOTES
Gastroesophageal reflux  Epigastric discomfort for 5 days    History of Present Illness:   Donna Vidales is a 79 y.o. female with PMHx of arthritis, hypertension, acid reflux, and 5 days of abdominal pain who presents to GI clinic for follow up.  Last seen around 2020 for upper endoscopy.  She denies black stool, dysphagia, nausea, vomiting or severe abdominal pain.  She has had improvement in her symptoms as she is went on a bland diet.  She stopped all of her medicines.  She brings a bag of medicines today to review.  She is on gabapentin, Cymbalta, magnesium, vitamin D, Prilosec over-the-counter, and started Florastor.  She has no other complaints    Her medications are reviewed    Her upper endoscopy is further reviewed        Review of Systems  Review of Systems   Constitutional: Negative.    HENT: Negative.     Eyes: Negative.    Respiratory: Negative.     Cardiovascular: Negative.    Gastrointestinal:  Positive for abdominal pain.   Endocrine: Negative.    Genitourinary: Negative.    Musculoskeletal: Negative.    Skin: Negative.    Allergic/Immunologic: Negative.    Neurological: Negative.    Hematological: Negative.    Psychiatric/Behavioral: Negative.     All other systems reviewed and are negative.      Allergies  Allergies   Allergen Reactions    Ciprocinonide Rash and Shortness of breath    Penicillins Itching, Rash, Hives and Shortness of breath    Augmentin [Amoxicillin-Pot Clavulanate] GI Upset    Cyclobenzaprine Other    Adhesive Itching    Ampicillin Itching and Rash     rash    Azatadine-Pseudoephedrine Itching and Rash    Ciprofloxacin Itching, Rash and Unknown    Erythromycin Itching and Rash    Guaifenesin Itching and Rash    Methocarbamol Itching, Rash, Unknown and Other    Shellfish Containing Products Rash    Sulfamethoxazole-Trimethoprim Rash    Trinalin Other and Itching       Medications  Current Outpatient Medications   Medication Instructions    albuterol 90 mcg/actuation inhaler 2 puffs,  inhalation, 4 times daily PRN    amLODIPine (Norvasc) 2.5 mg tablet TAKE 1 TABLET BY MOUTH EVERY  MORNING AND 1 TABLET BY MOUTH  EVERY EVENING AS NEEDED WHEN  BLOOD PRESSURE IS ABOVE 140/90    ascorbic acid (VITAMIN C) 1,000 mg, oral, Daily    cholecalciferol (VITAMIN D-3) 1,000 Units, oral, Daily RT    DULoxetine (Cymbalta) 20 mg DR capsule Take one cap q am . Do not crush or chew.    gabapentin (Neurontin) 100 mg capsule Take 1 to 2  tablets before bed time    magnesium citrate 250 mg, oral, Nightly    ondansetron ODT (Zofran-ODT) 4 mg disintegrating tablet Take one tablet under your tongue q day PRN nausea ONLY        Objective   There were no vitals taken for this visit.   Physical Exam  Constitutional:       Appearance: Normal appearance.   Eyes:      Conjunctiva/sclera: Conjunctivae normal.   Cardiovascular:      Rate and Rhythm: Normal rate and regular rhythm.   Pulmonary:      Effort: Pulmonary effort is normal.      Breath sounds: Normal breath sounds.   Abdominal:      General: Abdomen is flat. Bowel sounds are normal.      Palpations: Abdomen is soft.   Musculoskeletal:         General: Normal range of motion.      Cervical back: Normal range of motion.   Neurological:      Mental Status: She is alert.           Lab Results   Component Value Date    WBC 5.3 01/15/2024    WBC 6.0 11/22/2022    WBC 5.5 11/18/2021    HGB 13.0 01/15/2024    HGB 13.5 11/22/2022    HGB 13.6 11/18/2021    HCT 39.2 01/15/2024    HCT 40.7 11/22/2022    HCT 43.5 11/18/2021     01/15/2024     11/22/2022     11/18/2021     Lab Results   Component Value Date     01/15/2024     11/22/2022     11/18/2021    K 4.2 01/15/2024    K 4.0 11/22/2022    K 4.2 11/18/2021     01/15/2024     11/22/2022     11/18/2021    CO2 30 01/15/2024    CO2 30 11/22/2022    CO2 31 11/18/2021    BUN 10 01/15/2024    BUN 11 11/22/2022    BUN 12 11/18/2021    CREATININE 0.60 01/15/2024    CREATININE 0.67  11/22/2022    CREATININE 0.65 11/18/2021    CALCIUM 9.5 01/15/2024    CALCIUM 9.5 11/22/2022    CALCIUM 9.8 11/18/2021    PROT 6.9 01/15/2024    PROT 7.6 11/22/2022    PROT 8.1 11/18/2021    BILITOT 0.7 01/15/2024    BILITOT 0.6 11/22/2022    BILITOT 0.7 11/18/2021    ALKPHOS 59 01/15/2024    ALKPHOS 64 11/22/2022    ALKPHOS 67 11/18/2021    ALT 19 01/15/2024    ALT 17 11/22/2022    ALT 21 11/18/2021    AST 20 01/15/2024    AST 18 11/22/2022    AST 22 11/18/2021    GLUCOSE 109 (H) 01/15/2024    GLUCOSE 116 (H) 11/22/2022    GLUCOSE 110 (H) 11/18/2021           Donna Vidales is a 79 y.o. female who presents to GI clinic for abdominal pain and GERD.    Epigastric pain  Patient is an 79-year-old with history of hypertension, chronic arthritis pain, acid reflux, and who has had epigastric discomfort for 5 days.  She ate more of a bland diet and now feels completely better.  She is here with her bag of medicines and wants to know how to proceed in the future.  She denies any dysphagia, black stool or GI bleeding.  She had upper endoscopy showing mild gastritis in 2020.  Her examination is benign.  At this point I recommend she take Prilosec 20 mg daily, Tylenol and or gabapentin for arthritis.  She should minimize NSAIDs and I do not see other need for other intervention today such as blood work or imaging.  If she has recurrent symptoms we may consider blood work and/or repeat endoscopy.  All her questions are answered and she is comfortable with this approach.    Gastroesophageal reflux disease with esophagitis without hemorrhage  Patient is an 79-year-old with chronic acid reflux and will restart Prilosec.       Dno Rodney MD

## 2024-12-22 DIAGNOSIS — M48.061 SPINAL STENOSIS OF LUMBAR REGION WITHOUT NEUROGENIC CLAUDICATION: ICD-10-CM

## 2024-12-23 RX ORDER — GABAPENTIN 100 MG/1
CAPSULE ORAL
Qty: 200 CAPSULE | Refills: 0 | Status: SHIPPED | OUTPATIENT
Start: 2024-12-23

## 2025-01-03 ENCOUNTER — APPOINTMENT (OUTPATIENT)
Dept: PRIMARY CARE | Facility: CLINIC | Age: 80
End: 2025-01-03
Payer: MEDICARE

## 2025-01-03 VITALS
OXYGEN SATURATION: 97 % | SYSTOLIC BLOOD PRESSURE: 110 MMHG | HEART RATE: 82 BPM | DIASTOLIC BLOOD PRESSURE: 50 MMHG | HEIGHT: 62 IN | RESPIRATION RATE: 16 BRPM | WEIGHT: 136.47 LBS | BODY MASS INDEX: 25.11 KG/M2

## 2025-01-03 DIAGNOSIS — Z00.00 WELLNESS EXAMINATION: ICD-10-CM

## 2025-01-03 DIAGNOSIS — Z87.39: ICD-10-CM

## 2025-01-03 DIAGNOSIS — I10 PRIMARY HYPERTENSION: ICD-10-CM

## 2025-01-03 DIAGNOSIS — M48.061 SPINAL STENOSIS OF LUMBAR REGION WITHOUT NEUROGENIC CLAUDICATION: ICD-10-CM

## 2025-01-03 DIAGNOSIS — Z12.31 SCREENING MAMMOGRAM FOR BREAST CANCER: ICD-10-CM

## 2025-01-03 DIAGNOSIS — H91.93 BILATERAL HEARING LOSS, UNSPECIFIED HEARING LOSS TYPE: ICD-10-CM

## 2025-01-03 DIAGNOSIS — Z78.0 ASYMPTOMATIC MENOPAUSAL STATE: ICD-10-CM

## 2025-01-03 DIAGNOSIS — Z00.00 ROUTINE GENERAL MEDICAL EXAMINATION AT HEALTH CARE FACILITY: Primary | ICD-10-CM

## 2025-01-03 DIAGNOSIS — E55.9 VITAMIN D DEFICIENCY, UNSPECIFIED: ICD-10-CM

## 2025-01-03 PROBLEM — K11.20 PAROTIDITIS: Status: RESOLVED | Noted: 2024-09-17 | Resolved: 2025-01-03

## 2025-01-03 RX ORDER — AMLODIPINE BESYLATE 2.5 MG/1
TABLET ORAL
Qty: 200 TABLET | Refills: 1 | Status: SHIPPED | OUTPATIENT
Start: 2025-01-03

## 2025-01-03 RX ORDER — GABAPENTIN 100 MG/1
CAPSULE ORAL
Qty: 200 CAPSULE | Refills: 1 | Status: SHIPPED | OUTPATIENT
Start: 2025-01-03

## 2025-01-03 ASSESSMENT — ACTIVITIES OF DAILY LIVING (ADL)
GROCERY_SHOPPING: INDEPENDENT
BATHING: INDEPENDENT
TAKING_MEDICATION: INDEPENDENT
DOING_HOUSEWORK: INDEPENDENT
DRESSING: INDEPENDENT
MANAGING_FINANCES: INDEPENDENT

## 2025-01-03 ASSESSMENT — ENCOUNTER SYMPTOMS
OCCASIONAL FEELINGS OF UNSTEADINESS: 0
CONSTIPATION: 0
SHORTNESS OF BREATH: 0
BLOOD IN STOOL: 0
DEPRESSION: 0
PALPITATIONS: 0
NERVOUS/ANXIOUS: 0
LOSS OF SENSATION IN FEET: 0
ABDOMINAL DISTENTION: 0

## 2025-01-03 ASSESSMENT — PATIENT HEALTH QUESTIONNAIRE - PHQ9
1. LITTLE INTEREST OR PLEASURE IN DOING THINGS: NOT AT ALL
SUM OF ALL RESPONSES TO PHQ9 QUESTIONS 1 AND 2: 0
2. FEELING DOWN, DEPRESSED OR HOPELESS: NOT AT ALL

## 2025-01-03 NOTE — PATIENT INSTRUCTIONS
Start taking 3,000 units of vitamin d3 over the counter, contineu current medications, complete bone density, return here in 4 months.

## 2025-01-03 NOTE — ASSESSMENT & PLAN NOTE
Discussed diet, exercise, immunizations, and screening appropriate for their age.  Colonoscopy: sep 2022 no longer  Dexa: due  Mammogram: August 2024

## 2025-01-03 NOTE — PROGRESS NOTES
"Subjective   Patient ID: Donna Vidales is a 79 y.o. female who presents for Medicare Annual Wellness Visit Subsequent.    HPI Pt was able to travel to [facility] for 3 weeks, return 12 days ago. Her diet has important amount of starches, white meats mostly, infrequent sweets, 3 servings of fresh produce.   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 However she has right gluteal pain that extend to  knee  with feeling of tighness since she return from trip. She has appointment with ortho next week.  Denies other joint aches, she stopped duloxetine about 1 m ago. She is only takes 200 mg at bedtime of gabapentin with good tolerance.  She uses albuterol every am with great relieve of her wheezing  We review labs from October.    Review of Systems   Respiratory:  Negative for shortness of breath.    Cardiovascular:  Negative for palpitations.   Gastrointestinal:  Negative for abdominal distention, blood in stool and constipation.   Neurological:         Intermitent tinnitus and mild hearing lost   Psychiatric/Behavioral:  The patient is not nervous/anxious.    All other systems reviewed and are negative.      Objective   /50   Pulse 82   Resp 16   Ht 1.575 m (5' 2\")   Wt 61.9 kg (136 lb 7.4 oz)   SpO2 97%   BMI 24.96 kg/m²     Physical Exam  Eyes - conjunctiva clear, PERRLA  HEENT - no impacted wax  Neck - No cervical lymphadenopathy, no thyromegaly  Axilla - no palpable lymphadenopathy  Breast - visual exam: no asymmetry; palpation: nontender, no palpable nodules, retractions or discharge bilaterally  Cardiac - regular rate and rhythm, no murmurs, no carotid bruit, no JVP  Lung- clear to auscultation, no rales, no rhonchi, no wheezing  GI- normally active bowel sounds, nontender, nondistended, no hepatosplenomegaly, no rebound  MSK - no deformities  Neuro - non focal, oriented x 3  Extremities - no edema, good distal arterial pulses  Skin - no rash  Psychiatric - pleasant, cooperative, no " hallucinations    Assessment/Plan   Problem List Items Addressed This Visit             ICD-10-CM    Wellness examination Z00.00     Discussed diet, exercise, immunizations, and screening appropriate for their age.  Colonoscopy: sep 2022 no longer  Dexa: due  Mammogram: August 2024              Primary hypertension I10     Well control         Relevant Medications    amLODIPine (Norvasc) 2.5 mg tablet    Vitamin D deficiency, unspecified E55.9     Increase otc to 3,000 units of vitamin d3         Spinal stenosis of lumbar region without neurogenic claudication M48.061     Continue gabapentin at bedtime. Pt will see ortho this week. Prior consult advised medical management.         Relevant Medications    gabapentin (Neurontin) 100 mg capsule    Bilateral hearing loss H91.93    Relevant Orders    Referral to Audiology     Other Visit Diagnoses         Codes    Routine general medical examination at health care facility    -  Primary Z00.00    Relevant Orders    1 Year Follow Up In Primary Care - Wellness Exam    Screening mammogram for breast cancer     Z12.31    History of postmenopausal osteoporosis     Z87.39

## 2025-01-03 NOTE — ASSESSMENT & PLAN NOTE
Continue gabapentin at bedtime. Pt will see ortho this week. Prior consult advised medical management.

## 2025-01-09 ENCOUNTER — APPOINTMENT (OUTPATIENT)
Dept: RADIOLOGY | Facility: CLINIC | Age: 80
End: 2025-01-09
Payer: MEDICARE

## 2025-01-09 ENCOUNTER — HOSPITAL ENCOUNTER (OUTPATIENT)
Dept: RADIOLOGY | Facility: CLINIC | Age: 80
Discharge: HOME | End: 2025-01-09
Payer: MEDICARE

## 2025-01-09 ENCOUNTER — OFFICE VISIT (OUTPATIENT)
Dept: ORTHOPEDIC SURGERY | Facility: CLINIC | Age: 80
End: 2025-01-09
Payer: MEDICARE

## 2025-01-09 DIAGNOSIS — M25.562 ACUTE BILATERAL KNEE PAIN: ICD-10-CM

## 2025-01-09 DIAGNOSIS — M84.369A STRESS FRACTURE OF KNEE: ICD-10-CM

## 2025-01-09 DIAGNOSIS — M25.561 ACUTE BILATERAL KNEE PAIN: ICD-10-CM

## 2025-01-09 PROCEDURE — 99213 OFFICE O/P EST LOW 20 MIN: CPT | Performed by: ORTHOPAEDIC SURGERY

## 2025-01-09 PROCEDURE — 73560 X-RAY EXAM OF KNEE 1 OR 2: CPT | Mod: LT

## 2025-01-09 PROCEDURE — 73562 X-RAY EXAM OF KNEE 3: CPT | Mod: RT

## 2025-01-09 NOTE — PROGRESS NOTES
Patient is a-year-old female presents today for evaluation of right knee and leg pain.  She points to the head of her fibula on the lateral aspect of her knee as the main location of pain.  It began a little while ago after being seated for a long period of time.  She got up and had instant acute pain.  She takes Tylenol.  She does have underlying osteoarthritis.    Right knee:  AAOx3, NAD, walks with a moderate antalgic gait  Varus allignment  Range of motion lacks 10 degrees of full extension and flexes to 110 degrees  Stable to varus/valgus/anterior/posterior stress through out the range of motion  Slight laxity with varus stress  Diffuse medial  joint line tenderness to palpation  Moderate effusion  SILT in a franc/saph/per/tib distribution  5/5 knee extension/df/pf/ehl  ½ dorsalis pedis and posterior tibial pulse  no popliteal lymphadenopathy  no other overlying lesions  mood: euthymic  Respirations non labored  Significant tenderness palpation over the head of her fibula    Plain films are reviewed by myself in clinic today.  She has medial compartment OA evidenced by joint space narrowing and osteophytic change.  She is osteopenic.      My concern today in clinic is an insufficient fracture of the head of her fibula for lateral tibial plateau.  Her plain films only revealed medial compartment OA.  Will obtain an MRI for full evaluation.  When she has the MRI we will call on the phone and let her know what the results are.  All of her questions were answered.  For now she can continue conservative treatment including ice and Tylenol.    This note was created using voice recognition software and was not corrected for typographical or grammatical errors.

## 2025-01-21 ENCOUNTER — APPOINTMENT (OUTPATIENT)
Dept: PRIMARY CARE | Facility: CLINIC | Age: 80
End: 2025-01-21
Payer: MEDICARE

## 2025-01-22 ENCOUNTER — TELEPHONE (OUTPATIENT)
Dept: ENDOCRINOLOGY | Facility: CLINIC | Age: 80
End: 2025-01-22

## 2025-01-22 ENCOUNTER — TELEPHONE (OUTPATIENT)
Dept: PRIMARY CARE | Facility: CLINIC | Age: 80
End: 2025-01-22

## 2025-01-23 ENCOUNTER — OFFICE VISIT (OUTPATIENT)
Dept: PRIMARY CARE | Facility: CLINIC | Age: 80
End: 2025-01-23
Payer: MEDICARE

## 2025-01-23 ENCOUNTER — HOSPITAL ENCOUNTER (OUTPATIENT)
Dept: RADIOLOGY | Facility: CLINIC | Age: 80
Discharge: HOME | End: 2025-01-23
Payer: MEDICARE

## 2025-01-23 VITALS
SYSTOLIC BLOOD PRESSURE: 133 MMHG | RESPIRATION RATE: 16 BRPM | OXYGEN SATURATION: 95 % | BODY MASS INDEX: 24.09 KG/M2 | DIASTOLIC BLOOD PRESSURE: 79 MMHG | HEART RATE: 69 BPM | WEIGHT: 136 LBS

## 2025-01-23 DIAGNOSIS — F51.01 PRIMARY INSOMNIA: Primary | ICD-10-CM

## 2025-01-23 DIAGNOSIS — J01.90 ACUTE BACTERIAL SINUSITIS: ICD-10-CM

## 2025-01-23 DIAGNOSIS — M25.561 ACUTE BILATERAL KNEE PAIN: ICD-10-CM

## 2025-01-23 DIAGNOSIS — M25.562 ACUTE BILATERAL KNEE PAIN: ICD-10-CM

## 2025-01-23 DIAGNOSIS — I10 PRIMARY HYPERTENSION: ICD-10-CM

## 2025-01-23 DIAGNOSIS — B96.89 ACUTE BACTERIAL SINUSITIS: ICD-10-CM

## 2025-01-23 DIAGNOSIS — M84.369A STRESS FRACTURE OF KNEE: ICD-10-CM

## 2025-01-23 PROCEDURE — 99214 OFFICE O/P EST MOD 30 MIN: CPT | Performed by: INTERNAL MEDICINE

## 2025-01-23 PROCEDURE — 73721 MRI JNT OF LWR EXTRE W/O DYE: CPT | Mod: RIGHT SIDE | Performed by: RADIOLOGY

## 2025-01-23 PROCEDURE — 3075F SYST BP GE 130 - 139MM HG: CPT | Performed by: INTERNAL MEDICINE

## 2025-01-23 PROCEDURE — 3078F DIAST BP <80 MM HG: CPT | Performed by: INTERNAL MEDICINE

## 2025-01-23 PROCEDURE — 1159F MED LIST DOCD IN RCRD: CPT | Performed by: INTERNAL MEDICINE

## 2025-01-23 PROCEDURE — 73721 MRI JNT OF LWR EXTRE W/O DYE: CPT | Mod: RT

## 2025-01-23 PROCEDURE — 1036F TOBACCO NON-USER: CPT | Performed by: INTERNAL MEDICINE

## 2025-01-23 PROCEDURE — G2211 COMPLEX E/M VISIT ADD ON: HCPCS | Performed by: INTERNAL MEDICINE

## 2025-01-23 RX ORDER — AZITHROMYCIN 250 MG/1
TABLET, FILM COATED ORAL
Qty: 6 TABLET | Refills: 0 | Status: SHIPPED | OUTPATIENT
Start: 2025-01-23 | End: 2025-01-28

## 2025-01-23 RX ORDER — MINERAL OIL
180 ENEMA (ML) RECTAL DAILY PRN
COMMUNITY

## 2025-01-23 RX ORDER — QUETIAPINE FUMARATE 25 MG/1
TABLET, FILM COATED ORAL
Qty: 30 TABLET | Refills: 1 | Status: SHIPPED | OUTPATIENT
Start: 2025-01-23

## 2025-01-23 RX ORDER — FLUTICASONE FUROATE 27.5 UG/1
1 SPRAY, METERED NASAL
COMMUNITY

## 2025-01-23 ASSESSMENT — ENCOUNTER SYMPTOMS
APPETITE CHANGE: 0
SHORTNESS OF BREATH: 0
TROUBLE SWALLOWING: 0
VOICE CHANGE: 0
WHEEZING: 0
OCCASIONAL FEELINGS OF UNSTEADINESS: 1
DEPRESSION: 0
LOSS OF SENSATION IN FEET: 1

## 2025-01-23 ASSESSMENT — PATIENT HEALTH QUESTIONNAIRE - PHQ9
SUM OF ALL RESPONSES TO PHQ9 QUESTIONS 1 AND 2: 0
1. LITTLE INTEREST OR PLEASURE IN DOING THINGS: NOT AT ALL
2. FEELING DOWN, DEPRESSED OR HOPELESS: NOT AT ALL

## 2025-01-23 NOTE — PATIENT INSTRUCTIONS
Start antibioitic, continue nasal spray and allegra, call if feeling worse. Take new med 30 min prior to bed time. Return sooner than may as needed.

## 2025-01-23 NOTE — PROGRESS NOTES
Subjective   Patient ID: Donna Vidales is a 80 y.o. female who presents for flare in her sinus.and sleep aid.    HPI She has used  flonase nasal spray daily year around, also PRN fexofenadine, for chronic rhinnitis, however 1 week ago she had some chills, cold intolerance, headache , sore throat and since then her sinus are blocked, sinus tenderness, more fatigue, more sleepy.  Sick contact unknown  OTC acetaminophen for knee pain. Pain is worse when bend her knee and at night time. She had MRI today, pending ortho feedback, she needs some medication to help with sleep. She uses currently 200 mg gabapentin that help partially with pain but no with sleep    Review of Systems   Constitutional:  Negative for appetite change.   HENT:  Positive for postnasal drip. Negative for ear pain, trouble swallowing and voice change.    Respiratory:  Negative for shortness of breath and wheezing.    All other systems reviewed and are negative.      Objective   /79 (BP Location: Left arm, Patient Position: Sitting, BP Cuff Size: Adult)   Pulse 69   Resp 16   Wt 61.7 kg (136 lb)   SpO2 95%   BMI 24.09 kg/m²     Physical Exam  General- non toxic, no respiratory distress  Eyes- Conjunctiva clear  Ears- TMs clear, no erythema, no fluid, TMs not retracted or bulging  Nose- turbinates erythema, and drainage, bilateral frontal and maxilar sinus tenderness  Throat- mucus membranes moist, no erythema,  no exudates or lesions  Neck- nontender, no enlarged cervical lymphadenopathy  Cardiac- regular rate and rhythm.  Lung- clear to auscultation, no rales, no rhonchi, no wheezing  GI-  nontender, nondistended.  MSK right knee in frequent straight position to relieve pain  Extremities- no edema  Neuro- non focal, oriented x 3  Psychiatric- no hallucinations    Assessment/Plan   Problem List Items Addressed This Visit             ICD-10-CM    Primary hypertension I10     Well control         Acute bacterial sinusitis J01.90, B96.89      Continue nasal steroid and oral anti histaminic plus atb         Relevant Medications    azithromycin (Zithromax) 250 mg tablet    Primary insomnia - Primary F51.01     Trial of quetiapine, discussed side effects and benefits         Relevant Medications    QUEtiapine (SEROquel) 25 mg tablet

## 2025-01-23 NOTE — TELEPHONE ENCOUNTER
Patients pharmacy is calling because the patient was prescribed Azithromycin and Seroquel. Pharmacy states that they can cause heart problems. Pharmacy wants to know if it is safe to fill these prescriptions together. Please advise.

## 2025-01-28 ENCOUNTER — APPOINTMENT (OUTPATIENT)
Dept: RADIOLOGY | Facility: CLINIC | Age: 80
End: 2025-01-28
Payer: MEDICARE

## 2025-02-13 DIAGNOSIS — F51.01 PRIMARY INSOMNIA: ICD-10-CM

## 2025-02-13 RX ORDER — QUETIAPINE FUMARATE 25 MG/1
TABLET, FILM COATED ORAL
Qty: 30 TABLET | Refills: 5 | Status: SHIPPED | OUTPATIENT
Start: 2025-02-13

## 2025-05-05 ENCOUNTER — APPOINTMENT (OUTPATIENT)
Dept: PRIMARY CARE | Facility: CLINIC | Age: 80
End: 2025-05-05
Payer: MEDICARE

## 2025-05-05 VITALS
HEIGHT: 63 IN | OXYGEN SATURATION: 100 % | SYSTOLIC BLOOD PRESSURE: 122 MMHG | DIASTOLIC BLOOD PRESSURE: 68 MMHG | WEIGHT: 139.55 LBS | HEART RATE: 72 BPM | RESPIRATION RATE: 16 BRPM | BODY MASS INDEX: 24.73 KG/M2

## 2025-05-05 DIAGNOSIS — I10 PRIMARY HYPERTENSION: ICD-10-CM

## 2025-05-05 DIAGNOSIS — E55.9 VITAMIN D DEFICIENCY, UNSPECIFIED: ICD-10-CM

## 2025-05-05 DIAGNOSIS — M48.061 SPINAL STENOSIS OF LUMBAR REGION WITHOUT NEUROGENIC CLAUDICATION: ICD-10-CM

## 2025-05-05 DIAGNOSIS — E78.5 DYSLIPIDEMIA: ICD-10-CM

## 2025-05-05 DIAGNOSIS — R73.9 HYPERGLYCEMIA: ICD-10-CM

## 2025-05-05 DIAGNOSIS — F51.01 PRIMARY INSOMNIA: Primary | ICD-10-CM

## 2025-05-05 PROBLEM — J01.90 ACUTE BACTERIAL SINUSITIS: Status: RESOLVED | Noted: 2025-01-23 | Resolved: 2025-05-05

## 2025-05-05 PROBLEM — B96.89 ACUTE BACTERIAL SINUSITIS: Status: RESOLVED | Noted: 2025-01-23 | Resolved: 2025-05-05

## 2025-05-05 PROBLEM — C67.5: Status: RESOLVED | Noted: 2024-08-29 | Resolved: 2025-05-05

## 2025-05-05 PROCEDURE — 1123F ACP DISCUSS/DSCN MKR DOCD: CPT | Performed by: INTERNAL MEDICINE

## 2025-05-05 PROCEDURE — 1036F TOBACCO NON-USER: CPT | Performed by: INTERNAL MEDICINE

## 2025-05-05 PROCEDURE — 1160F RVW MEDS BY RX/DR IN RCRD: CPT | Performed by: INTERNAL MEDICINE

## 2025-05-05 PROCEDURE — 1159F MED LIST DOCD IN RCRD: CPT | Performed by: INTERNAL MEDICINE

## 2025-05-05 PROCEDURE — G2211 COMPLEX E/M VISIT ADD ON: HCPCS | Performed by: INTERNAL MEDICINE

## 2025-05-05 PROCEDURE — 3078F DIAST BP <80 MM HG: CPT | Performed by: INTERNAL MEDICINE

## 2025-05-05 PROCEDURE — 1158F ADVNC CARE PLAN TLK DOCD: CPT | Performed by: INTERNAL MEDICINE

## 2025-05-05 PROCEDURE — 99214 OFFICE O/P EST MOD 30 MIN: CPT | Performed by: INTERNAL MEDICINE

## 2025-05-05 PROCEDURE — 3074F SYST BP LT 130 MM HG: CPT | Performed by: INTERNAL MEDICINE

## 2025-05-05 RX ORDER — QUETIAPINE FUMARATE 25 MG/1
TABLET, FILM COATED ORAL
Qty: 90 TABLET | Refills: 1 | Status: SHIPPED | OUTPATIENT
Start: 2025-05-05

## 2025-05-05 ASSESSMENT — ENCOUNTER SYMPTOMS
LOSS OF SENSATION IN FEET: 0
DEPRESSION: 0
OCCASIONAL FEELINGS OF UNSTEADINESS: 0
HYPERTENSION: 1

## 2025-05-05 NOTE — PROGRESS NOTES
"Subjective   Patient ID: Donna Vidales is a 80 y.o. female who presents for hypertension, insomnia, refills, labs    Hypertension     Pt was in [place] for 3 months, walks more often that here. Her joints have less pain there. Diet is very balance mostly fish , some red meats.   She did not request any medical visit while out of state  She has responded excellent to quetiapine 25 mg.  She was seen by ortho due to trauma on right knee. MRI did not confirm fracture but torn of cruciate ligament and OA.    Review of Systems   HENT:          Intense tinnitus and hearing lost, has ENT appointment   All other systems reviewed and are negative.      Objective   /68   Pulse 72   Resp 16   Ht 1.6 m (5' 3\")   Wt 63.3 kg (139 lb 8.8 oz)   SpO2 100%   BMI 24.72 kg/m²     Physical Exam  Eyes- Conjunctiva clear  No impacted wax  Neck-no thyromegaly  Cardiac- regular rate and rhythm, no murmurs  Lung- clear to auscultation  GI- present  bowel sounds, nontender, no rebound  MSK- no deformities, cracking sound on right Knee at ROM no tender at palpation  Extremities- no edema, good distal pulses  Neuro- non focal, oriented x 3  Psychiatric- pleasant, well groomed, no hallucinations    Assessment/Plan   Problem List Items Addressed This Visit           ICD-10-CM    Primary hypertension I10    Well control         Vitamin D deficiency, unspecified E55.9    Relevant Orders    Vitamin D 25-Hydroxy,Total (for eval of Vitamin D levels)    Spinal stenosis of lumbar region without neurogenic claudication M48.061    Continue gabapentin         Primary insomnia - Primary F51.01    Well control on current seroquel         Relevant Medications    QUEtiapine (SEROquel) 25 mg tablet     Other Visit Diagnoses         Codes      Dyslipidemia     E78.5    Relevant Orders    Lipid panel    Hemoglobin A1C      Hyperglycemia     R73.9    Relevant Orders    Hemoglobin A1C               "

## 2025-05-08 NOTE — PROGRESS NOTES
AUDIOLOGIC EVALUATION    Name:  Donna Vidales  :    1945  Age:     80 y.o.    HISTORY:     Patient was seen for initial hearing test in conjunction with medical follow-up for []. Patient was seen for repeat hearing test in conjunction with medical follow-up. Recall most recent hearing test on file on [] revealed []. She He [] reported [].    Denied hearing loss, tinnitus, otalgia, aural pressure/fullness, recent ear infections, otorrhea, dizziness, noise exposure, and [] chemotherapy/radiation, [] prior ear surgeries, head trauma, and family history of hearing loss.     EVALUATION:    Please see Audiogram.    RESULTS:    Otoscopy:  Right: []  Left: []    Tympanometry:  Right: []  Left: []    Hearing Sensitivity:  Right: []  Left: []    Word Recognition Score (NU-6 [] ):  Right: [] ( [] %) at [] dBHL which is equal to / [] above [] average speech level (55-60 dBHL)  Left: [] ( [] %) at [] dBHL which is equal to / [] above [] average speech level (55-60 dBHL)    IMPRESSIONS:    Compared to previous hearing evaluation on [], []  No previous hearing evaluation on file. []    ASSESSMENT AND PLAN:    - Continue medical follow-up with Dr. Moreno.  - Consider further evaluation/ [] monitoring of asymmetric hearing loss, unilateral tinnitus, and dizziness [] .  - Pending medical clearance, [] consider hearing needs assessment to discuss management of hearing loss [] and tinnitus.  - Monitor and recheck hearing as warranted.  - Counseled regarding results and recommendations.    - Counseled regarding tinnitus and tinnitus management strategies (Provide soft background noise to help mask tinnitus. Reduce stress factors and consider lifestyle changes, including limiting caffeine, alcohol, and nicotine intake. Manage stress and anxiety, ensure adequate sleep, engage in regular exercise, and maintain a healthy diet).    - Counseled regarding noise exposure and use of hearing protection.    - Counseled regarding auditory  processing, communication strategies, and considerations for further evaluation or management of these concerns.  Reducing background noise when possible.  Having others gain patient's attention prior to beginning a conversation.  Maintaining appropriate conversational distance (no more than 6 feet away) with minimal barriers.  Having conversational partners face the patient when they are talking to them.  Having conversational partners speak at an audible volume, clear dictation, and not too quickly.  Using facial cues (i.e. reading lips).    - [facility] to [facility].     - Consider referral to [facility] hearing implant team for further discussion regarding implantable technologies and candidacy consideration.    - Continue use of amplification and follow-up as recommended for hearing aid maintenance and adjustments.      Giulia Bowser.  Clinical Audiologist.

## 2025-05-09 LAB
25(OH)D3+25(OH)D2 SERPL-MCNC: 30 NG/ML (ref 30–100)
CHOLEST SERPL-MCNC: 179 MG/DL
CHOLEST/HDLC SERPL: 2.6 (CALC)
EST. AVERAGE GLUCOSE BLD GHB EST-MCNC: 126 MG/DL
EST. AVERAGE GLUCOSE BLD GHB EST-SCNC: 7 MMOL/L
HBA1C MFR BLD: 6 %
HDLC SERPL-MCNC: 68 MG/DL
LDLC SERPL CALC-MCNC: 91 MG/DL (CALC)
NONHDLC SERPL-MCNC: 111 MG/DL (CALC)
TRIGL SERPL-MCNC: 106 MG/DL

## 2025-05-10 ENCOUNTER — APPOINTMENT (OUTPATIENT)
Dept: OTOLARYNGOLOGY | Facility: CLINIC | Age: 80
End: 2025-05-10
Payer: MEDICARE

## 2025-05-10 ENCOUNTER — APPOINTMENT (OUTPATIENT)
Dept: AUDIOLOGY | Facility: CLINIC | Age: 80
End: 2025-05-10
Payer: MEDICARE

## 2025-05-10 VITALS — BODY MASS INDEX: 24.63 KG/M2 | WEIGHT: 139 LBS | HEIGHT: 63 IN

## 2025-05-10 DIAGNOSIS — H90.3 SENSORINEURAL HEARING LOSS (SNHL) OF BOTH EARS: ICD-10-CM

## 2025-05-10 DIAGNOSIS — K11.20 PAROTIDITIS: ICD-10-CM

## 2025-05-10 DIAGNOSIS — H93.13 TINNITUS OF BOTH EARS: Primary | ICD-10-CM

## 2025-05-10 DIAGNOSIS — H60.8X3 CHRONIC ECZEMATOUS OTITIS EXTERNA OF BOTH EARS: ICD-10-CM

## 2025-05-10 PROCEDURE — 99204 OFFICE O/P NEW MOD 45 MIN: CPT | Performed by: OTOLARYNGOLOGY

## 2025-05-10 PROCEDURE — 1036F TOBACCO NON-USER: CPT | Performed by: OTOLARYNGOLOGY

## 2025-05-10 PROCEDURE — 1160F RVW MEDS BY RX/DR IN RCRD: CPT | Performed by: OTOLARYNGOLOGY

## 2025-05-10 PROCEDURE — 1159F MED LIST DOCD IN RCRD: CPT | Performed by: OTOLARYNGOLOGY

## 2025-05-10 RX ORDER — FLUOCINOLONE ACETONIDE 0.11 MG/ML
OIL AURICULAR (OTIC)
Qty: 20 ML | Refills: 3 | Status: SHIPPED | OUTPATIENT
Start: 2025-05-10

## 2025-05-10 ASSESSMENT — ENCOUNTER SYMPTOMS: DIZZINESS: 1

## 2025-05-10 NOTE — PROGRESS NOTES
Subjective   Patient ID: Donna Vidales is a 80 y.o. female  HPI  Patient is complaining of a chronic history of bilateral nonpulsatile tinnitus.  She has no otalgia and no otorrhea and no vertigo.  She also complains of chronic itching in her ears bilaterally.  She has not noticed a sudden change in her hearing recently.  She does complain of a chronic history of hearing loss.    Review of Systems   HENT:  Positive for hearing loss and tinnitus.    Neurological:  Positive for dizziness.       Objective   Physical Exam  The following elements of a brief ear nose and throat exam were performed: External ear canals and tympanic membranes, external nose and nasal passages, oral cavity, palpation of the neck, percussion of the face, palpation of the thyroid.    There is dry flaking of the skin in ear canals consistent with eczema bilaterally.  The tympanic membranes are clear and mobile.  There is clinical evidence of hearing loss noted during conversation.  The remainder of her exam was within normal limits.    Assessment/Plan   Diagnoses and all orders for this visit:  Tinnitus of both ears (Primary)  -     Tympanometry Only; Future  -     Comprehensive hearing test; Future  Parotiditis  -     Referral to ENT  Sensorineural hearing loss (SNHL) of both ears  -     Tympanometry Only; Future  -     Comprehensive hearing test; Future  Chronic eczematous otitis externa of both ears  -     fluocinolone (DermOtic) 0.01 % ear drops; Instill 5 drops in the affected ear once a day as needed for itching     1.  Chronic bilateral nonpulsatile tinnitus and clinical evidence of hearing loss.  The patient was scheduled for an audiogram and a tympanogram.  2.  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  The patient was started on DermOtic drops.

## 2025-05-13 ENCOUNTER — HOSPITAL ENCOUNTER (OUTPATIENT)
Dept: RADIOLOGY | Facility: CLINIC | Age: 80
Discharge: HOME | End: 2025-05-13
Payer: MEDICARE

## 2025-05-13 DIAGNOSIS — Z78.0 ASYMPTOMATIC MENOPAUSAL STATE: ICD-10-CM

## 2025-05-13 PROCEDURE — 77080 DXA BONE DENSITY AXIAL: CPT

## 2025-05-13 PROCEDURE — 77080 DXA BONE DENSITY AXIAL: CPT | Performed by: RADIOLOGY

## 2025-05-17 ENCOUNTER — HOSPITAL ENCOUNTER (OUTPATIENT)
Dept: RADIOLOGY | Facility: CLINIC | Age: 80
Discharge: HOME | End: 2025-05-17
Payer: MEDICARE

## 2025-05-17 ENCOUNTER — OFFICE VISIT (OUTPATIENT)
Dept: URGENT CARE | Age: 80
End: 2025-05-17
Payer: MEDICARE

## 2025-05-17 VITALS
SYSTOLIC BLOOD PRESSURE: 163 MMHG | RESPIRATION RATE: 18 BRPM | HEART RATE: 67 BPM | TEMPERATURE: 98 F | DIASTOLIC BLOOD PRESSURE: 64 MMHG | OXYGEN SATURATION: 98 % | BODY MASS INDEX: 25.03 KG/M2 | HEIGHT: 62 IN | WEIGHT: 136 LBS

## 2025-05-17 DIAGNOSIS — M47.812 CERVICAL SPONDYLOSIS: ICD-10-CM

## 2025-05-17 DIAGNOSIS — S59.911A INJURY OF RIGHT FOREARM, INITIAL ENCOUNTER: Primary | ICD-10-CM

## 2025-05-17 DIAGNOSIS — S59.911A INJURY OF RIGHT FOREARM, INITIAL ENCOUNTER: ICD-10-CM

## 2025-05-17 PROCEDURE — 72050 X-RAY EXAM NECK SPINE 4/5VWS: CPT

## 2025-05-17 PROCEDURE — 73090 X-RAY EXAM OF FOREARM: CPT | Mod: RT

## 2025-05-17 PROCEDURE — 73090 X-RAY EXAM OF FOREARM: CPT | Mod: RIGHT SIDE | Performed by: RADIOLOGY

## 2025-05-17 PROCEDURE — 72050 X-RAY EXAM NECK SPINE 4/5VWS: CPT | Performed by: RADIOLOGY

## 2025-05-17 ASSESSMENT — ENCOUNTER SYMPTOMS
ARTHRALGIAS: 1
OCCASIONAL FEELINGS OF UNSTEADINESS: 0
LOSS OF SENSATION IN FEET: 0
DEPRESSION: 0

## 2025-05-17 NOTE — PATIENT INSTRUCTIONS
Splint will stay on until you see orthopedics. A referral was placed for you, call the number below to make your appointment. Follow up within 1 week. Do not get the splint wet. Read attached instructions about splint care.

## 2025-05-17 NOTE — PROGRESS NOTES
Subjective   Patient ID: Donna Vidales is a 80 y.o. female. They present today with a chief complaint of Injury (Fell this morning in driveway while walking injured right arm).    History of Present Illness  Pt presents with R arm injury. Pt tripped her driveway this morning, landing with foosh mechanism. She reports pain in the proximal forearm. Reports decreased rom due to pain. - headstrike or other injury in the fall. She denies numbness tingling weakness.       History provided by:  Patient  Injury      Past Medical History  Allergies as of 05/17/2025 - Reviewed 05/17/2025   Allergen Reaction Noted    Ciprocinonide Rash and Shortness of breath 11/04/2010    Penicillins Itching, Rash, Hives, and Shortness of breath 11/04/2010    Augmentin [amoxicillin-pot clavulanate] GI Upset 05/21/2024    Cyclobenzaprine Other 11/14/2023    Adhesive Itching 11/18/2010    Ampicillin Itching and Rash 07/02/2016    Azatadine-pseudoephedrine Itching and Rash 11/14/2023    Ciprofloxacin Itching, Rash, and Unknown 09/11/2013    Erythromycin Itching and Rash 11/04/2010    Guaifenesin Itching and Rash 11/14/2023    Methocarbamol Itching, Rash, Unknown, and Other 09/11/2013    Shellfish containing products Rash 07/02/2016    Sulfamethoxazole-trimethoprim Rash 11/18/2010    Trinalin Other and Itching 11/18/2010       Prescriptions Prior to Admission[1]     Medical History[2]    Surgical History[3]     reports that she has never smoked. She has never used smokeless tobacco. She reports current alcohol use of about 7.0 standard drinks of alcohol per week. She reports that she does not use drugs.    Review of Systems  Review of Systems   Musculoskeletal:  Positive for arthralgias.   All other systems reviewed and are negative.                                 Objective    Vitals:    05/17/25 1045   BP: 163/64   BP Location: Left arm   Patient Position: Sitting   BP Cuff Size: Adult   Pulse: 67   Resp: 18   Temp: 36.7 °C (98 °F)   TempSrc:  "Oral   SpO2: 98%   Weight: 61.7 kg (136 lb)   Height: 1.575 m (5' 2\")     No LMP recorded. Patient is postmenopausal.    Physical Exam  Vitals and nursing note reviewed.   Constitutional:       General: She is not in acute distress.     Appearance: Normal appearance. She is not ill-appearing, toxic-appearing or diaphoretic.   Musculoskeletal:      Right shoulder: Normal.      Right upper arm: Normal.      Right elbow: No swelling, deformity, effusion or lacerations. Normal range of motion. No tenderness.      Right forearm: Swelling, tenderness (near radial head; unable to pronate or supinate due to pain) and bony tenderness present. No deformity.      Right wrist: Normal.      Right hand: Normal.   Neurological:      Mental Status: She is alert.         Procedures    Point of Care Test & Imaging Results from this visit  No results found for this visit on 05/17/25.   Imaging  XR forearm right 2 views  Result Date: 5/17/2025  No evidence for acute fracture or dislocation. Signed by Logan Allen MD      Cardiology, Vascular, and Other Imaging  No other imaging results found for the past 2 days      Diagnostic study results (if any) were reviewed by Tere Boyce PA-C.    Assessment/Plan   Allergies, medications, history, and pertinent labs/EKGs/Imaging reviewed by Tere Boyce PA-C.     Medical Decision Making  There was no in house xray available, pt was provided ordered and completed xray at  [facility] and returned to clinic after xray was completed.  Xray read as negative however I have concern for a radial head fracture given area of tenderness, mechanism of injury, swelling, and significant pain with pronation and supination. The rue was placed in posterior long arm splint by ma. I assessed the splint and extremity after splint application, remained NVI and appropriately placed. Provided arm sling. Referral to orthopedics placed, advised follow up within 1 week. Pt understands, agrees to plan. "     Orders and Diagnoses  Diagnoses and all orders for this visit:  Injury of right forearm, initial encounter  -     XR forearm right 2 views; Future  -     Referral to Orthopedics and Sports Medicine; Future      Medical Admin Record      Patient disposition: Home    Electronically signed by Tere Boyce PA-C  2:57 PM           [1] (Not in a hospital admission)   [2]   Past Medical History:  Diagnosis Date    Abdominal tenderness, unspecified site 09/02/2014    Abdominal tenderness    Abscess of vulva 10/20/2020    Boil of vulva    Acute bacterial sinusitis 01/23/2025    Acute frontal sinusitis, unspecified 07/06/2022    Acute non-recurrent frontal sinusitis    Acute sinusitis, unspecified 07/06/2018    Subacute sinusitis    Acute upper respiratory infection, unspecified 11/19/2015    Acute URI    Benign paroxysmal vertigo, unspecified ear 08/17/2015    Benign positional vertigo    Candidal esophagitis (Multi) 09/08/2021    Candida esophagitis    Cough, unspecified 07/06/2016    Cough    Dysphagia, pharyngoesophageal phase 07/14/2020    Pharyngoesophageal dysphagia    Encounter for screening for other viral diseases 05/27/2020    Screening for viral disease    Essential (primary) hypertension 11/22/2022    Hypertension    Fever, unspecified 07/05/2018    Fever and chills    Mild intermittent asthma with (acute) exacerbation ([facility]) 07/06/2016    Mild intermittent reactive airway disease with acute exacerbation    Other seasonal allergic rhinitis 12/13/2019    Chronic seasonal allergic rhinitis    Other specified anxiety disorders 08/04/2020    Depression with anxiety    Other specified symptoms and signs involving the circulatory and respiratory systems 09/08/2021    Globus sensation    Other specified symptoms and signs involving the circulatory and respiratory systems 09/08/2021    Globus sensation    Pain in left knee 09/30/2022    Chronic pain of left knee    Parotiditis 09/17/2024    Personal history  of diseases of the skin and subcutaneous tissue 12/27/2016    History of skin pruritus    Personal history of malignant neoplasm of bladder 06/19/2015    History of malignant neoplasm of bladder    Personal history of other diseases of the digestive system     History of esophageal reflux    Personal history of other diseases of the female genital tract 10/08/2019    History of vaginitis    Personal history of other diseases of the musculoskeletal system and connective tissue 06/04/2018    History of low back pain    Personal history of other diseases of the musculoskeletal system and connective tissue 11/11/2015    History of low back pain    Personal history of other diseases of the musculoskeletal system and connective tissue 06/04/2018    History of arthritis    Personal history of other diseases of the respiratory system 01/10/2018    History of bronchitis    Personal history of other diseases of the respiratory system 07/07/2016    History of acute bronchitis with bronchospasm    Personal history of other diseases of urinary system     History of bladder problems    Personal history of other infectious and parasitic diseases 09/03/2013    History of tinea corporis    Personal history of other specified conditions 07/18/2021    History of dysphagia    Personal history of other specified conditions 05/17/2016    History of exertional chest pain    Personal history of other specified conditions     History of heartburn    Personal history of other specified conditions 09/02/2014    History of diarrhea    Personal history of other specified conditions     History of diarrhea    Personal history of other specified conditions 10/02/2015    History of vertigo    Personal history of other specified conditions 06/19/2015    History of dizziness    Personal history of other specified conditions 10/10/2016    History of memory loss    Personal history of other specified conditions 11/16/2021    History of weakness     Personal history of urinary (tract) infections 11/11/2015    History of urinary tract infection    Primary insomnia 10/01/2020    Insomnia, idiopathic    Pruritus ani 09/08/2021    Anal pruritus    Tinnitus, bilateral 10/02/2015    Tinnitus, bilateral    Unspecified symptoms and signs involving cognitive functions and awareness 09/05/2019    Cognitive complaints    Unspecified symptoms and signs involving the genitourinary system 10/20/2020    UTI symptoms    Unspecified symptoms and signs involving the genitourinary system     UTI symptoms   [3]   Past Surgical History:  Procedure Laterality Date    BLADDER SURGERY  05/31/2013    Bladder Surgery    BREAST SURGERY  05/31/2013    Breast Surgery    CATARACT EXTRACTION  10/02/2015    Cataract Surgery    GALLBLADDER SURGERY  05/31/2013    Gallbladder Surgery    KNEE SURGERY  10/02/2015    Knee Surgery

## 2025-05-18 DIAGNOSIS — I10 PRIMARY HYPERTENSION: ICD-10-CM

## 2025-05-19 ENCOUNTER — APPOINTMENT (OUTPATIENT)
Dept: PRIMARY CARE | Facility: CLINIC | Age: 80
End: 2025-05-19
Payer: MEDICARE

## 2025-05-19 RX ORDER — AMLODIPINE BESYLATE 2.5 MG/1
TABLET ORAL
Qty: 200 TABLET | Refills: 0 | Status: SHIPPED | OUTPATIENT
Start: 2025-05-19

## 2025-05-20 ENCOUNTER — OFFICE VISIT (OUTPATIENT)
Dept: ORTHOPEDIC SURGERY | Facility: HOSPITAL | Age: 80
End: 2025-05-20
Payer: MEDICARE

## 2025-05-20 ENCOUNTER — HOSPITAL ENCOUNTER (OUTPATIENT)
Dept: RADIOLOGY | Facility: HOSPITAL | Age: 80
Discharge: HOME | End: 2025-05-20
Payer: MEDICARE

## 2025-05-20 ENCOUNTER — APPOINTMENT (OUTPATIENT)
Dept: ORTHOPEDIC SURGERY | Facility: CLINIC | Age: 80
End: 2025-05-20
Payer: MEDICARE

## 2025-05-20 DIAGNOSIS — M25.521 RIGHT ELBOW PAIN: Primary | ICD-10-CM

## 2025-05-20 DIAGNOSIS — S16.1XXA CERVICAL STRAIN, ACUTE, INITIAL ENCOUNTER: ICD-10-CM

## 2025-05-20 DIAGNOSIS — M25.521 RIGHT ELBOW PAIN: ICD-10-CM

## 2025-05-20 DIAGNOSIS — M47.812 CERVICAL SPONDYLOSIS: Primary | ICD-10-CM

## 2025-05-20 DIAGNOSIS — S59.911A INJURY OF RIGHT FOREARM, INITIAL ENCOUNTER: ICD-10-CM

## 2025-05-20 PROCEDURE — 1036F TOBACCO NON-USER: CPT | Performed by: STUDENT IN AN ORGANIZED HEALTH CARE EDUCATION/TRAINING PROGRAM

## 2025-05-20 PROCEDURE — 1159F MED LIST DOCD IN RCRD: CPT | Performed by: PHYSICIAN ASSISTANT

## 2025-05-20 PROCEDURE — 99213 OFFICE O/P EST LOW 20 MIN: CPT | Performed by: STUDENT IN AN ORGANIZED HEALTH CARE EDUCATION/TRAINING PROGRAM

## 2025-05-20 PROCEDURE — 99214 OFFICE O/P EST MOD 30 MIN: CPT | Performed by: PHYSICIAN ASSISTANT

## 2025-05-20 PROCEDURE — 99202 OFFICE O/P NEW SF 15 MIN: CPT | Performed by: STUDENT IN AN ORGANIZED HEALTH CARE EDUCATION/TRAINING PROGRAM

## 2025-05-20 PROCEDURE — 1125F AMNT PAIN NOTED PAIN PRSNT: CPT | Performed by: STUDENT IN AN ORGANIZED HEALTH CARE EDUCATION/TRAINING PROGRAM

## 2025-05-20 PROCEDURE — 73080 X-RAY EXAM OF ELBOW: CPT | Mod: RIGHT SIDE | Performed by: RADIOLOGY

## 2025-05-20 PROCEDURE — 73080 X-RAY EXAM OF ELBOW: CPT | Mod: RT

## 2025-05-20 PROCEDURE — 1159F MED LIST DOCD IN RCRD: CPT | Performed by: STUDENT IN AN ORGANIZED HEALTH CARE EDUCATION/TRAINING PROGRAM

## 2025-05-20 RX ORDER — LIDOCAINE 50 MG/G
1 PATCH TOPICAL DAILY
Qty: 30 PATCH | Refills: 0 | Status: SHIPPED | OUTPATIENT
Start: 2025-05-20 | End: 2025-05-30

## 2025-05-20 RX ORDER — NAPROXEN 500 MG/1
500 TABLET ORAL 2 TIMES DAILY
Qty: 60 TABLET | Refills: 0 | Status: SHIPPED | OUTPATIENT
Start: 2025-05-20 | End: 2025-06-19

## 2025-05-20 ASSESSMENT — PAIN SCALES - GENERAL: PAINLEVEL_OUTOF10: 6

## 2025-05-20 ASSESSMENT — PAIN DESCRIPTION - DESCRIPTORS: DESCRIPTORS: SORE

## 2025-05-20 ASSESSMENT — PAIN - FUNCTIONAL ASSESSMENT: PAIN_FUNCTIONAL_ASSESSMENT: 0-10

## 2025-05-20 NOTE — PROGRESS NOTES
"HPI:  Donna Vidales is a 80 y.o. female who presents to the spine clinic today for evaluation of neck pain.     Reports neck pain, stiffness for several months. Recently had a fall this past Saturday resulting in RUE injury, however denies striking head/neck or LOC. No increased neck pain s/p fall.     Pain increased with driving and in the morning. Describes pain as achy/sore. Her head feels \"heavy\" and unsupported - feels her neck muscles are weak. Notes cracking in the neck at times.   Had tingling in the LUE approx 2 months ago which has since resolved. At this time no radicular pain/numbness/tingling.   Notes weakness in the RUE due to recent injury, no UE weakness prior to that.   Intermittent balance instability. Ambulates independently without assistive devices.     Takes Gabapentin 200mg nightly and IBU prn.     No recent PT for the cervical spine.     She is not diabetic. She is a nonsmoker.     She cannot drive due to her RUE injury. Her  is blind and cannot drive so she is relying on her neighbor for transportation to appointments.     She has an appointment later today with Dr. Toledo for evaluation of her right arm.     ROS:  Reviewed on EMR and patient intake sheet.    PMH/SH:  Reviewed on EMR and patient intake sheet.    Exam:  Physical Exam  Spine Musculoskeletal Exam    Well appearing, NAD  Pleasant & cooperative  BMI 24.87  Decreased ROM cervical spine with rotation, flexion/extension  No paraspinal muscle spasms  C spine NTTP  upper extremity sensation intact to light touch  RUE in splint and sling; LUE extremity motor 5/5 throughout; no focal motor weakness  normal gait & station; assistive devices: none  SLR: n/a  Reflexes: hypo; neg hoffmans      Radiology:     Xrays cervical spine dated 05/17/25 personally reviewed, along with the accompanying report when available.     FINDINGS:  Vertebral body height was preserved throughout.  Moderate disc space narrowing with endplate spurring at " C5-6 and  C6-7. Mild joint space loss with subchondral sclerosis and cystic  changes in the atlantoaxial joint. Uncovertebral hypertrophy with  spur formation on the right at C5-6 and bilaterally at C6-7.  Multilevel interfacet hypertrophy with spur formation. No destructive  bone lesion. No prevertebral soft tissue swelling.  The dens was grossly intact.  No definite listhesis.      Mild foraminal narrowing on the left at C5-6. Mild foraminal  narrowing on the right at C4-5, C5-6, and C6-7..      Moderate calcification in the region of the right carotid bulb.      The imaged lung apices were grossly clear. No tracheal deviation.      IMPRESSION:  Cervical spine DJD as described.    Assessment and Plan:  1. Cervical spondylosis (Primary)  2. Cervical strain, acute, initial encounter  - XR cervical spine complete 4-5 views; Future  - Referral to Physical Therapy; Future  - naproxen (Naprosyn) 500 mg tablet; Take 1 tablet (500 mg) by mouth 2 times a day.  Dispense: 60 tablet; Refill: 0  - lidocaine (Lidoderm) 5 % patch; Place 1 patch over 12 hours on the skin once daily for 10 days. Remove & discard patch within 12 hours or as directed by MD.  Dispense: 30 patch; Refill: 0    I reviewed the XR images in detail with Donna today. Patient with multilevel degenerative changes, no fractures or signs of instability.     Referral placed for outpatient PT today. Discussed if she is unable to find transportation we can place a referral for [facility], however I think she would receive maximum benefit from outpatient PT if it can be arranged.     She was instructed on gentle cervical ROM exercises in the meantime - advised to limit neck flexion & extension.    Prescriptions for Naproxen & Lidocaine patches provided today. Encouraged heat application and topical analgesics including IcyHot & BioFreeze prn.     Patient in agreement to plan of care. Of course Donna Vidales is welcome to call at anytime with questions or concerns.      Rosy Kim PA-C  Department of Orthopaedic Surgery    [address] [place] [address]    Voicemail: [phone]   Appts: [phone]  Fax: [phone]

## 2025-05-20 NOTE — PROGRESS NOTES
History of Present Illness   Patient presents today for evaluation of side: right upper extremity pain.    The patient sustained an acute injury on 5/17/2025.  She tripped in her driveway and fell onto her outstretched right upper extremity.  She had immediate elbow pain.  She went to urgent care.  She had x-rays of her forearm which were negative.  She did not have any dedicated x-rays to her elbow.  There was a concern for possible radial head/neck fracture based on her mechanism and tenderness however the forearm radiographs did not demonstrate this.  She was given a splint and a sling and subsequently referred to me.  The patient denies any loss of consciousness or additional significant injuries.  The patient denies any current numbness or tingling.  The pain is sharp, acute in nature, better with rest worse with motion.     Medical History[1]    Medication Documentation Review Audit       Reviewed by Susana Chau LPN (Licensed Nurse) on 05/20/25 at 1302      Medication Order Taking? Sig Documenting Provider Last Dose Status   albuterol 90 mcg/actuation inhaler [id number]  Inhale 2 puffs 4 times a day as needed for wheezing or shortness of breath. Brianna Arguello MD  Active     Discontinued 05/19/25 1105   amLODIPine (Norvasc) 2.5 mg tablet [id number]  TAKE 1 TABLET BY MOUTH IN THE  MORNING AND 1 TABLET BY MOUTH IN THE EVENING Leonila Segal PA-C  Active   ascorbic acid (Vitamin C) 1,000 mg tablet [id number] No Take 1 tablet (1,000 mg) by mouth once daily. Historical Provider, MD Taking Active   cholecalciferol (Vitamin D-3) 25 MCG (1000 UT) capsule [id number] No Take 1 capsule (1,000 Units) by mouth once daily. Historical Provider, MD Taking Active   fexofenadine (Allegra) 180 mg tablet [id number]  Take 1 tablet (180 mg) by mouth once daily as needed. Historical Provider, MD  Active   fluocinolone (DermOtic) 0.01 % ear drops [id number]  Instill 5 drops in the affected ear once a day as needed for  itching Yi Moreno MD  Active   fluticasone (Flonase Sensimist) 27.5 mcg/actuation nasal spray [id number]  Administer 1 spray into each nostril once daily. Historical Provider, MD  Active   gabapentin (Neurontin) 100 mg capsule [id number]  TAKE  2 CAPSULES BY MOUTH  BEFORE BEDTIME Brianna Arguello MD  Active   lidocaine (Lidoderm) 5 % patch [id number]  Place 1 patch over 12 hours on the skin once daily for 10 days. Remove & discard patch within 12 hours or as directed by MD. Rosy Kim PA-C  Active   magnesium citrate 125 mg capsule [id number]  Take 250 mg by mouth once daily at bedtime. Historical Provider, MD  Active   naproxen (Naprosyn) 500 mg tablet [id number]  Take 1 tablet (500 mg) by mouth 2 times a day. Rosy Kim PA-C  Active   QUEtiapine (SEROquel) 25 mg tablet [id number]  1 tab 30 min prior to bed time Brianna Arguello MD  Active                    RX Allergies[2]    Social History     Socioeconomic History    Marital status:      Spouse name: Not on file    Number of children: Not on file    Years of education: Not on file    Highest education level: Not on file   Occupational History    Not on file   Tobacco Use    Smoking status: Never    Smokeless tobacco: Never   Vaping Use    Vaping status: Never Used   Substance and Sexual Activity    Alcohol use: Yes     Alcohol/week: 7.0 standard drinks of alcohol     Types: 7 Glasses of wine per week    Drug use: Never    Sexual activity: Not Currently     Partners: Male   Other Topics Concern    Not on file   Social History Narrative    Not on file     Social Drivers of Health     Financial Resource Strain: Not on file   Food Insecurity: Not on file   Transportation Needs: Not on file   Physical Activity: Not on file   Stress: Not on file   Social Connections: Not on file   Intimate Partner Violence: Not on file   Housing Stability: Not on file       Surgical History[3]       Review of Systems   GENERAL: Negative  GI:  Negative  MUSCULOSKELETAL: See HPI  SKIN: Negative  NEURO:  Negative     Physical Exam:  side: right upper extremity:  Sling and splint were removed  Skin healthy to gross inspection, no breakdown  Mild swelling noted to the lateral side of the elbow.  No significant ecchymosis  Tender to palpation to her radial head.  30  to 120 degrees range of motion  Full pronation supination however there is pain with full supination.  Intact flexion and extension of 1st IP joint and finger abduction  Sensation intact to light touch medial / ulnar and radial nerve distribution   Good cap refill     Imaging  XR of the right forearm were reviewed in office today dated 5/17/2025  2 views demonstrates no fracture dislocations     XR of the right elbow taken reviewed in office today  There is a nondisplaced radial neck fracture.       Assessment   Patient with an acute side: right nondisplaced radial neck fracture fracture      Plan:  She may continue with her sling while in public.  She may discontinue this while resting or at home.  She is to come out of the sling 4 times a day to work on gentle elbow range of motion.  She was given exercises for range of motion.  We discussed the importance of maintaining motion as elbows like to get stiff.  Discussed with the patient that this fracture is amenable to non-surgical management.  Discussed a period of immobilization and serial radiographs followed by rehab and return to activities.     I have subsequently placed a order for hand therapy in case she notes stiffness in her elbow.     Follow-up /repeat x-rays                    [1]   Past Medical History:  Diagnosis Date    Abdominal tenderness, unspecified site 09/02/2014    Abdominal tenderness    Abscess of vulva 10/20/2020    Boil of vulva    Acute bacterial sinusitis 01/23/2025    Acute frontal sinusitis, unspecified 07/06/2022    Acute non-recurrent frontal sinusitis    Acute sinusitis, unspecified 07/06/2018    Subacute  sinusitis    Acute upper respiratory infection, unspecified 11/19/2015    Acute URI    Benign paroxysmal vertigo, unspecified ear 08/17/2015    Benign positional vertigo    Candidal esophagitis (Multi) 09/08/2021    Candida esophagitis    Cough, unspecified 07/06/2016    Cough    Dysphagia, pharyngoesophageal phase 07/14/2020    Pharyngoesophageal dysphagia    Encounter for screening for other viral diseases 05/27/2020    Screening for viral disease    Essential (primary) hypertension 11/22/2022    Hypertension    Fever, unspecified 07/05/2018    Fever and chills    Mild intermittent asthma with (acute) exacerbation ([facility]) 07/06/2016    Mild intermittent reactive airway disease with acute exacerbation    Other seasonal allergic rhinitis 12/13/2019    Chronic seasonal allergic rhinitis    Other specified anxiety disorders 08/04/2020    Depression with anxiety    Other specified symptoms and signs involving the circulatory and respiratory systems 09/08/2021    Globus sensation    Other specified symptoms and signs involving the circulatory and respiratory systems 09/08/2021    Globus sensation    Pain in left knee 09/30/2022    Chronic pain of left knee    Parotiditis 09/17/2024    Personal history of diseases of the skin and subcutaneous tissue 12/27/2016    History of skin pruritus    Personal history of malignant neoplasm of bladder 06/19/2015    History of malignant neoplasm of bladder    Personal history of other diseases of the digestive system     History of esophageal reflux    Personal history of other diseases of the female genital tract 10/08/2019    History of vaginitis    Personal history of other diseases of the musculoskeletal system and connective tissue 06/04/2018    History of low back pain    Personal history of other diseases of the musculoskeletal system and connective tissue 11/11/2015    History of low back pain    Personal history of other diseases of the musculoskeletal system and  connective tissue 06/04/2018    History of arthritis    Personal history of other diseases of the respiratory system 01/10/2018    History of bronchitis    Personal history of other diseases of the respiratory system 07/07/2016    History of acute bronchitis with bronchospasm    Personal history of other diseases of urinary system     History of bladder problems    Personal history of other infectious and parasitic diseases 09/03/2013    History of tinea corporis    Personal history of other specified conditions 07/18/2021    History of dysphagia    Personal history of other specified conditions 05/17/2016    History of exertional chest pain    Personal history of other specified conditions     History of heartburn    Personal history of other specified conditions 09/02/2014    History of diarrhea    Personal history of other specified conditions     History of diarrhea    Personal history of other specified conditions 10/02/2015    History of vertigo    Personal history of other specified conditions 06/19/2015    History of dizziness    Personal history of other specified conditions 10/10/2016    History of memory loss    Personal history of other specified conditions 11/16/2021    History of weakness    Personal history of urinary (tract) infections 11/11/2015    History of urinary tract infection    Primary insomnia 10/01/2020    Insomnia, idiopathic    Pruritus ani 09/08/2021    Anal pruritus    Tinnitus, bilateral 10/02/2015    Tinnitus, bilateral    Unspecified symptoms and signs involving cognitive functions and awareness 09/05/2019    Cognitive complaints    Unspecified symptoms and signs involving the genitourinary system 10/20/2020    UTI symptoms    Unspecified symptoms and signs involving the genitourinary system     UTI symptoms   [2]   Allergies  Allergen Reactions    Ciprocinonide Rash and Shortness of breath    Penicillins Itching, Rash, Hives and Shortness of breath    Augmentin [Amoxicillin-Pot  Clavulanate] GI Upset    Cyclobenzaprine Other    Adhesive Itching    Ampicillin Itching and Rash     rash    Azatadine-Pseudoephedrine Itching and Rash    Ciprofloxacin Itching, Rash and Unknown    Erythromycin Itching and Rash    Guaifenesin Itching and Rash    Methocarbamol Itching, Rash, Unknown and Other    Shellfish Containing Products Rash    Sulfamethoxazole-Trimethoprim Rash    Trinalin Other and Itching   [3]   Past Surgical History:  Procedure Laterality Date    BLADDER SURGERY  05/31/2013    Bladder Surgery    BREAST SURGERY  05/31/2013    Breast Surgery    CATARACT EXTRACTION  10/02/2015    Cataract Surgery    GALLBLADDER SURGERY  05/31/2013    Gallbladder Surgery    KNEE SURGERY  10/02/2015    Knee Surgery

## 2025-05-21 ENCOUNTER — TELEPHONE (OUTPATIENT)
Dept: PRIMARY CARE | Facility: CLINIC | Age: 80
End: 2025-05-21

## 2025-05-21 ENCOUNTER — APPOINTMENT (OUTPATIENT)
Dept: ORTHOPEDIC SURGERY | Facility: CLINIC | Age: 80
End: 2025-05-21
Payer: MEDICARE

## 2025-05-22 ENCOUNTER — APPOINTMENT (OUTPATIENT)
Dept: RADIOLOGY | Facility: HOSPITAL | Age: 80
End: 2025-05-22
Payer: MEDICARE

## 2025-05-22 ENCOUNTER — APPOINTMENT (OUTPATIENT)
Dept: AUDIOLOGY | Facility: CLINIC | Age: 80
End: 2025-05-22
Payer: MEDICARE

## 2025-05-22 ENCOUNTER — APPOINTMENT (OUTPATIENT)
Dept: OTOLARYNGOLOGY | Facility: CLINIC | Age: 80
End: 2025-05-22
Payer: MEDICARE

## 2025-05-29 ENCOUNTER — TELEPHONE (OUTPATIENT)
Dept: PRIMARY CARE | Facility: CLINIC | Age: 80
End: 2025-05-29
Payer: MEDICARE

## 2025-06-06 DIAGNOSIS — M48.061 SPINAL STENOSIS OF LUMBAR REGION WITHOUT NEUROGENIC CLAUDICATION: ICD-10-CM

## 2025-06-09 RX ORDER — GABAPENTIN 100 MG/1
CAPSULE ORAL
Qty: 200 CAPSULE | Refills: 1 | Status: SHIPPED | OUTPATIENT
Start: 2025-06-09

## 2025-06-17 ENCOUNTER — OFFICE VISIT (OUTPATIENT)
Dept: ORTHOPEDIC SURGERY | Facility: HOSPITAL | Age: 80
End: 2025-06-17
Payer: MEDICARE

## 2025-06-17 ENCOUNTER — HOSPITAL ENCOUNTER (OUTPATIENT)
Dept: RADIOLOGY | Facility: HOSPITAL | Age: 80
Discharge: HOME | End: 2025-06-17
Payer: MEDICARE

## 2025-06-17 DIAGNOSIS — M25.521 RIGHT ELBOW PAIN: Primary | ICD-10-CM

## 2025-06-17 DIAGNOSIS — M25.521 RIGHT ELBOW PAIN: ICD-10-CM

## 2025-06-17 PROCEDURE — 73080 X-RAY EXAM OF ELBOW: CPT | Mod: RIGHT SIDE | Performed by: RADIOLOGY

## 2025-06-17 PROCEDURE — 73080 X-RAY EXAM OF ELBOW: CPT | Mod: RT

## 2025-06-17 PROCEDURE — 99213 OFFICE O/P EST LOW 20 MIN: CPT | Performed by: STUDENT IN AN ORGANIZED HEALTH CARE EDUCATION/TRAINING PROGRAM

## 2025-06-17 PROCEDURE — G2211 COMPLEX E/M VISIT ADD ON: HCPCS | Performed by: STUDENT IN AN ORGANIZED HEALTH CARE EDUCATION/TRAINING PROGRAM

## 2025-06-17 PROCEDURE — L3924 HFO WITHOUT JOINTS PRE OTS: HCPCS | Performed by: STUDENT IN AN ORGANIZED HEALTH CARE EDUCATION/TRAINING PROGRAM

## 2025-06-17 PROCEDURE — 1159F MED LIST DOCD IN RCRD: CPT | Performed by: STUDENT IN AN ORGANIZED HEALTH CARE EDUCATION/TRAINING PROGRAM

## 2025-06-17 PROCEDURE — 1036F TOBACCO NON-USER: CPT | Performed by: STUDENT IN AN ORGANIZED HEALTH CARE EDUCATION/TRAINING PROGRAM

## 2025-06-17 PROCEDURE — 99212 OFFICE O/P EST SF 10 MIN: CPT | Performed by: STUDENT IN AN ORGANIZED HEALTH CARE EDUCATION/TRAINING PROGRAM

## 2025-06-17 ASSESSMENT — PAIN SCALES - GENERAL: PAINLEVEL_OUTOF10: 5 - MODERATE PAIN

## 2025-06-17 ASSESSMENT — PAIN DESCRIPTION - DESCRIPTORS: DESCRIPTORS: SORE

## 2025-06-17 ASSESSMENT — PAIN - FUNCTIONAL ASSESSMENT: PAIN_FUNCTIONAL_ASSESSMENT: 0-10

## 2025-06-17 NOTE — PROGRESS NOTES
History of Present Illness   Patient presents today for evaluation of side: right upper extremity pain.    The patient sustained an acute injury on 5/17/2025.  She tripped in her driveway and fell onto her outstretched right upper extremity.  She had immediate elbow pain.  She went to urgent care.  She had x-rays of her forearm which were negative.  She did not have any dedicated x-rays to her elbow.  There was a concern for possible radial head/neck fracture based on her mechanism and tenderness however the forearm radiographs did not demonstrate this.  She was given a splint and a sling and subsequently referred to me.  The patient denies any loss of consciousness or additional significant injuries.  The patient denies any current numbness or tingling.  The pain is sharp, acute in nature, better with rest worse with motion.    6/17/2025 follow-up:  Patient continues to presentPatient is approximately 4 weeks from the above injury.  She has been work on elbow range of motion and has good motion.  Notes continued mild to moderate right elbow pain which extends over the dorsum of her forearm.  She also notes pain at the base of her thumb.  She notes this is worse with pinching and grasping.  She is unable to open a bottle secondary to this pain.  Denies any new injuries.    Medical History[1]    Medication Documentation Review Audit       Reviewed by Susana Chau LPN (Licensed Nurse) on 06/17/25 at 1312      Medication Order Taking? Sig Documenting Provider Last Dose Status   albuterol 90 mcg/actuation inhaler [id number]  Inhale 2 puffs 4 times a day as needed for wheezing or shortness of breath. Brianna Arguello MD  Active   amLODIPine (Norvasc) 2.5 mg tablet [id number]  TAKE 1 TABLET BY MOUTH IN THE  MORNING AND 1 TABLET BY MOUTH IN THE EVENING Leonila Segal PA-C  Active   ascorbic acid (Vitamin C) 1,000 mg tablet [id number] No Take 1 tablet (1,000 mg) by mouth once daily. Historical Provider, MD Taking  Active   cholecalciferol (Vitamin D-3) 25 MCG (1000 UT) capsule [id number] No Take 1 capsule (1,000 Units) by mouth once daily. Historical Provider, MD Taking Active   fexofenadine (Allegra) 180 mg tablet [id number]  Take 1 tablet (180 mg) by mouth once daily as needed. Historical Provider, MD  Active   fluocinolone (DermOtic) 0.01 % ear drops [id number]  Instill 5 drops in the affected ear once a day as needed for itching Yi Moreno MD  Active   fluticasone (Flonase Sensimist) 27.5 mcg/actuation nasal spray [id number]  Administer 1 spray into each nostril once daily. Historical Provider, MD  Active   gabapentin (Neurontin) 100 mg capsule [id number]  TAKE 2 CAPSULES BY MOUTH BEFORE  BEDTIME Brianna Arguello MD  Active   magnesium citrate 125 mg capsule [id number]  Take 250 mg by mouth once daily at bedtime. Historical Provider, MD  Active   naproxen (Naprosyn) 500 mg tablet [id number]  Take 1 tablet (500 mg) by mouth 2 times a day. Rosy Kim PA-C  Active   QUEtiapine (SEROquel) 25 mg tablet [id number]  1 tab 30 min prior to bed time Brianna Arguello MD  Active                    RX Allergies[2]    Social History     Socioeconomic History    Marital status:      Spouse name: Not on file    Number of children: Not on file    Years of education: Not on file    Highest education level: Not on file   Occupational History    Not on file   Tobacco Use    Smoking status: Never    Smokeless tobacco: Never   Vaping Use    Vaping status: Never Used   Substance and Sexual Activity    Alcohol use: Yes     Alcohol/week: 7.0 standard drinks of alcohol     Types: 7 Glasses of wine per week    Drug use: Never    Sexual activity: Not Currently     Partners: Male   Other Topics Concern    Not on file   Social History Narrative    Not on file     Social Drivers of Health     Financial Resource Strain: Not on file   Food Insecurity: Not on file   Transportation Needs: Not on file   Physical Activity: Not on file    Stress: Not on file   Social Connections: Not on file   Intimate Partner Violence: Not on file   Housing Stability: Not on file       Surgical History[3]       Review of Systems   GENERAL: Negative  GI: Negative  MUSCULOSKELETAL: See HPI  SKIN: Negative  NEURO:  Negative     Physical Exam:  side: right upper extremity:  Sling was removed   skin healthy to gross inspection, no breakdown  No significant swelling noted to the lateral side of the elbow.  No significant ecchymosis  Tender to palpation to her radial head.  10 to 140 degrees range of motion  Full pronation and supination with minimal pain.  Nontender over the anatomic snuffbox.  Tender to palpation over the CMC joint.  Nontender over the distal radius or first dorsal apartment.  Nontender over the ulnar-sided wrist.  Intact flexion and extension of 1st IP joint and finger abduction  Sensation intact to light touch medial / ulnar and radial nerve distribution   Good cap refill     Imaging  XR of the right forearm were reviewed in office today dated 5/17/2025  2 views demonstrates no fracture dislocations     XR of the right elbow taken and reviewed in office today and compared to previous x-rays  There is a nondisplaced radial neck fracture with callus formation present.       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  She can continue work on range of motion.  We discussed that her pain is likely an aggravation of thumb CMC arthritis.  Her forearm x-rays were reviewed do not demonstrate any fractures or dislocations.  At this time she was provided with a thumb CMC Comfort Cool brace.  She can continue use diclofenac gel over the affected areas.  We will see her back in 4 to 6 weeks.  At that time we will order an x-ray of her right hand.  Follow-up 4 to 6 weeks with x-rays of the right hand                      [1]   Past Medical History:  Diagnosis  Date    Abdominal tenderness, unspecified site 09/02/2014    Abdominal tenderness    Abscess of vulva 10/20/2020    Boil of vulva    Acute bacterial sinusitis 01/23/2025    Acute frontal sinusitis, unspecified 07/06/2022    Acute non-recurrent frontal sinusitis    Acute sinusitis, unspecified 07/06/2018    Subacute sinusitis    Acute upper respiratory infection, unspecified 11/19/2015    Acute URI    Benign paroxysmal vertigo, unspecified ear 08/17/2015    Benign positional vertigo    Candidal esophagitis (Multi) 09/08/2021    Candida esophagitis    Cough, unspecified 07/06/2016    Cough    Dysphagia, pharyngoesophageal phase 07/14/2020    Pharyngoesophageal dysphagia    Encounter for screening for other viral diseases 05/27/2020    Screening for viral disease    Essential (primary) hypertension 11/22/2022    Hypertension    Fever, unspecified 07/05/2018    Fever and chills    Mild intermittent asthma with (acute) exacerbation ([facility]) 07/06/2016    Mild intermittent reactive airway disease with acute exacerbation    Other seasonal allergic rhinitis 12/13/2019    Chronic seasonal allergic rhinitis    Other specified anxiety disorders 08/04/2020    Depression with anxiety    Other specified symptoms and signs involving the circulatory and respiratory systems 09/08/2021    Globus sensation    Other specified symptoms and signs involving the circulatory and respiratory systems 09/08/2021    Globus sensation    Pain in left knee 09/30/2022    Chronic pain of left knee    Parotiditis 09/17/2024    Personal history of diseases of the skin and subcutaneous tissue 12/27/2016    History of skin pruritus    Personal history of malignant neoplasm of bladder 06/19/2015    History of malignant neoplasm of bladder    Personal history of other diseases of the digestive system     History of esophageal reflux    Personal history of other diseases of the female genital tract 10/08/2019    History of vaginitis    Personal history of  other diseases of the musculoskeletal system and connective tissue 06/04/2018    History of low back pain    Personal history of other diseases of the musculoskeletal system and connective tissue 11/11/2015    History of low back pain    Personal history of other diseases of the musculoskeletal system and connective tissue 06/04/2018    History of arthritis    Personal history of other diseases of the respiratory system 01/10/2018    History of bronchitis    Personal history of other diseases of the respiratory system 07/07/2016    History of acute bronchitis with bronchospasm    Personal history of other diseases of urinary system     History of bladder problems    Personal history of other infectious and parasitic diseases 09/03/2013    History of tinea corporis    Personal history of other specified conditions 07/18/2021    History of dysphagia    Personal history of other specified conditions 05/17/2016    History of exertional chest pain    Personal history of other specified conditions     History of heartburn    Personal history of other specified conditions 09/02/2014    History of diarrhea    Personal history of other specified conditions     History of diarrhea    Personal history of other specified conditions 10/02/2015    History of vertigo    Personal history of other specified conditions 06/19/2015    History of dizziness    Personal history of other specified conditions 10/10/2016    History of memory loss    Personal history of other specified conditions 11/16/2021    History of weakness    Personal history of urinary (tract) infections 11/11/2015    History of urinary tract infection    Primary insomnia 10/01/2020    Insomnia, idiopathic    Pruritus ani 09/08/2021    Anal pruritus    Tinnitus, bilateral 10/02/2015    Tinnitus, bilateral    Unspecified symptoms and signs involving cognitive functions and awareness 09/05/2019    Cognitive complaints    Unspecified symptoms and signs involving the  genitourinary system 10/20/2020    UTI symptoms    Unspecified symptoms and signs involving the genitourinary system     UTI symptoms   [2]   Allergies  Allergen Reactions    Ciprocinonide Rash and Shortness of breath    Penicillins Itching, Rash, Hives and Shortness of breath    Augmentin [Amoxicillin-Pot Clavulanate] GI Upset    Cyclobenzaprine Other    Adhesive Itching    Ampicillin Itching and Rash     rash    Azatadine-Pseudoephedrine Itching and Rash    Ciprofloxacin Itching, Rash and Unknown    Erythromycin Itching and Rash    Guaifenesin Itching and Rash    Methocarbamol Itching, Rash, Unknown and Other    Shellfish Containing Products Rash    Sulfamethoxazole-Trimethoprim Rash    Trinalin Other and Itching   [3]   Past Surgical History:  Procedure Laterality Date    BLADDER SURGERY  05/31/2013    Bladder Surgery    BREAST SURGERY  05/31/2013    Breast Surgery    CATARACT EXTRACTION  10/02/2015    Cataract Surgery    GALLBLADDER SURGERY  05/31/2013    Gallbladder Surgery    KNEE SURGERY  10/02/2015    Knee Surgery

## 2025-07-15 ENCOUNTER — HOSPITAL ENCOUNTER (OUTPATIENT)
Dept: RADIOLOGY | Facility: HOSPITAL | Age: 80
Discharge: HOME | End: 2025-07-15
Payer: MEDICARE

## 2025-07-15 ENCOUNTER — OFFICE VISIT (OUTPATIENT)
Dept: ORTHOPEDIC SURGERY | Facility: HOSPITAL | Age: 80
End: 2025-07-15
Payer: MEDICARE

## 2025-07-15 DIAGNOSIS — S59.911A INJURY OF RIGHT FOREARM, INITIAL ENCOUNTER: Primary | ICD-10-CM

## 2025-07-15 DIAGNOSIS — S59.911A INJURY OF RIGHT FOREARM, INITIAL ENCOUNTER: ICD-10-CM

## 2025-07-15 PROCEDURE — 1036F TOBACCO NON-USER: CPT | Performed by: STUDENT IN AN ORGANIZED HEALTH CARE EDUCATION/TRAINING PROGRAM

## 2025-07-15 PROCEDURE — 99212 OFFICE O/P EST SF 10 MIN: CPT | Performed by: STUDENT IN AN ORGANIZED HEALTH CARE EDUCATION/TRAINING PROGRAM

## 2025-07-15 PROCEDURE — 99214 OFFICE O/P EST MOD 30 MIN: CPT | Performed by: STUDENT IN AN ORGANIZED HEALTH CARE EDUCATION/TRAINING PROGRAM

## 2025-07-15 PROCEDURE — 73130 X-RAY EXAM OF HAND: CPT | Mod: RT

## 2025-07-15 PROCEDURE — 73130 X-RAY EXAM OF HAND: CPT | Mod: RIGHT SIDE | Performed by: RADIOLOGY

## 2025-07-15 PROCEDURE — 1159F MED LIST DOCD IN RCRD: CPT | Performed by: STUDENT IN AN ORGANIZED HEALTH CARE EDUCATION/TRAINING PROGRAM

## 2025-07-15 ASSESSMENT — PAIN - FUNCTIONAL ASSESSMENT: PAIN_FUNCTIONAL_ASSESSMENT: NO/DENIES PAIN

## 2025-07-15 NOTE — PROGRESS NOTES
History of Present Illness   Patient presents today for evaluation of side: right upper extremity pain.    The patient sustained an acute injury on 5/17/2025.  She tripped in her driveway and fell onto her outstretched right upper extremity.  She had immediate elbow pain.  She went to urgent care.  She had x-rays of her forearm which were negative.  She did not have any dedicated x-rays to her elbow.  There was a concern for possible radial head/neck fracture based on her mechanism and tenderness however the forearm radiographs did not demonstrate this.  She was given a splint and a sling and subsequently referred to me.  The patient denies any loss of consciousness or additional significant injuries.  The patient denies any current numbness or tingling.  The pain is sharp, acute in nature, better with rest worse with motion.    6/17/2025 follow-up:  Patient continues to presentPatient is approximately 4 weeks from the above injury.  She has been work on elbow range of motion and has good motion.  Notes continued mild to moderate right elbow pain which extends over the dorsum of her forearm.  She also notes pain at the base of her thumb.  She notes this is worse with pinching and grasping.  She is unable to open a bottle secondary to this pain.  Denies any new injuries.    7/15/2025 follow-up:  Patient seen and evaluated today.  She denies any right elbow pain.  She notes she is gradually return to activity as tolerated.  She is having no complications.  At last visit we provided her with a CMC Comfort Cool brace.  She says she is not wearing it as she does not like it.  She notes that her pain is improved to the base of her thumb.    Medical History[1]    Medication Documentation Review Audit       Reviewed by Susana Chau LPN (Licensed Nurse) on 07/15/25 at 1317      Medication Order Taking? Sig Documenting Provider Last Dose Status   albuterol 90 mcg/actuation inhaler [id number]  Inhale 2 puffs 4 times a day as  needed for wheezing or shortness of breath. Brianna Arguello MD  Active   amLODIPine (Norvasc) 2.5 mg tablet [id number]  TAKE 1 TABLET BY MOUTH IN THE  MORNING AND 1 TABLET BY MOUTH IN THE EVENING Leonila Segal PA-C  Active   ascorbic acid (Vitamin C) 1,000 mg tablet [id number] No Take 1 tablet (1,000 mg) by mouth once daily. Historical Provider, MD Taking Active   cholecalciferol (Vitamin D-3) 25 MCG (1000 UT) capsule [id number] No Take 1 capsule (1,000 Units) by mouth once daily. Historical Provider, MD Taking Active   fexofenadine (Allegra) 180 mg tablet [id number]  Take 1 tablet (180 mg) by mouth once daily as needed. Historical Provider, MD  Active   fluocinolone (DermOtic) 0.01 % ear drops [id number]  Instill 5 drops in the affected ear once a day as needed for itching Yi Moreno MD  Active   fluticasone (Flonase Sensimist) 27.5 mcg/actuation nasal spray [id number]  Administer 1 spray into each nostril once daily. Historical Provider, MD  Active   gabapentin (Neurontin) 100 mg capsule [id number]  TAKE 2 CAPSULES BY MOUTH BEFORE  BEDTIME Brianna Arguello MD  Active   magnesium citrate 125 mg capsule [id number]  Take 250 mg by mouth once daily at bedtime. Historical Provider, MD  Active   QUEtiapine (SEROquel) 25 mg tablet [id number]  1 tab 30 min prior to bed time Brianna Arguello MD  Active                    RX Allergies[2]    Social History     Socioeconomic History    Marital status:      Spouse name: Not on file    Number of children: Not on file    Years of education: Not on file    Highest education level: Not on file   Occupational History    Not on file   Tobacco Use    Smoking status: Never    Smokeless tobacco: Never   Vaping Use    Vaping status: Never Used   Substance and Sexual Activity    Alcohol use: Yes     Alcohol/week: 7.0 standard drinks of alcohol     Types: 7 Glasses of wine per week    Drug use: Never    Sexual activity: Not Currently     Partners: Male   Other Topics  Concern    Not on file   Social History Narrative    Not on file     Social Drivers of Health     Financial Resource Strain: Not on file   Food Insecurity: Not on file   Transportation Needs: Not on file   Physical Activity: Not on file   Stress: Not on file   Social Connections: Not on file   Intimate Partner Violence: Not on file   Housing Stability: Not on file       Surgical History[3]       Review of Systems   GENERAL: Negative  GI: Negative  MUSCULOSKELETAL: See HPI  SKIN: Negative  NEURO:  Negative     Physical Exam:  side: right upper extremity:  skin healthy to gross inspection, no breakdown  No significant swelling noted to the lateral side of the elbow.  No significant ecchymosis  Nontender to palpation to her radial head.  0 to 140 degrees range of motion  Full pronation and supination with minimal pain.  Nontender over the anatomic snuffbox.  Nontender to palpation over the CMC joint.  Nontender over the distal radius or first dorsal apartment.  Nontender over the ulnar-sided wrist.  Intact flexion and extension of 1st IP joint and finger abduction  Sensation intact to light touch medial / ulnar and radial nerve distribution   Good cap refill     Imaging  XR of the right forearm were reviewed in office today dated 6/17/2025   2 views demonstrates no fracture dislocations     XR of the right hand was taken and reviewed in office today  There is mild to moderate diffuse arthritic changes throughout.       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  She has no restrictions.  Overall her thumb CMC arthritis is improving and she is not wearing her CMC Comfort Cool brace as it did not provide her with any significant relief.  She has no restrictions in regard to her hand or elbow.  She may follow-up on as-needed basis.                      [1]   Past Medical History:  Diagnosis Date     Abdominal tenderness, unspecified site 09/02/2014    Abdominal tenderness    Abscess of vulva 10/20/2020    Boil of vulva    Acute bacterial sinusitis 01/23/2025    Acute frontal sinusitis, unspecified 07/06/2022    Acute non-recurrent frontal sinusitis    Acute sinusitis, unspecified 07/06/2018    Subacute sinusitis    Acute upper respiratory infection, unspecified 11/19/2015    Acute URI    Benign paroxysmal vertigo, unspecified ear 08/17/2015    Benign positional vertigo    Candidal esophagitis (Multi) 09/08/2021    Candida esophagitis    Cough, unspecified 07/06/2016    Cough    Dysphagia, pharyngoesophageal phase 07/14/2020    Pharyngoesophageal dysphagia    Encounter for screening for other viral diseases 05/27/2020    Screening for viral disease    Essential (primary) hypertension 11/22/2022    Hypertension    Fever, unspecified 07/05/2018    Fever and chills    Mild intermittent asthma with (acute) exacerbation ([facility]) 07/06/2016    Mild intermittent reactive airway disease with acute exacerbation    Other seasonal allergic rhinitis 12/13/2019    Chronic seasonal allergic rhinitis    Other specified anxiety disorders 08/04/2020    Depression with anxiety    Other specified symptoms and signs involving the circulatory and respiratory systems 09/08/2021    Globus sensation    Other specified symptoms and signs involving the circulatory and respiratory systems 09/08/2021    Globus sensation    Pain in left knee 09/30/2022    Chronic pain of left knee    Parotiditis 09/17/2024    Personal history of diseases of the skin and subcutaneous tissue 12/27/2016    History of skin pruritus    Personal history of malignant neoplasm of bladder 06/19/2015    History of malignant neoplasm of bladder    Personal history of other diseases of the digestive system     History of esophageal reflux    Personal history of other diseases of the female genital tract 10/08/2019    History of vaginitis    Personal history of other  diseases of the musculoskeletal system and connective tissue 06/04/2018    History of low back pain    Personal history of other diseases of the musculoskeletal system and connective tissue 11/11/2015    History of low back pain    Personal history of other diseases of the musculoskeletal system and connective tissue 06/04/2018    History of arthritis    Personal history of other diseases of the respiratory system 01/10/2018    History of bronchitis    Personal history of other diseases of the respiratory system 07/07/2016    History of acute bronchitis with bronchospasm    Personal history of other diseases of urinary system     History of bladder problems    Personal history of other infectious and parasitic diseases 09/03/2013    History of tinea corporis    Personal history of other specified conditions 07/18/2021    History of dysphagia    Personal history of other specified conditions 05/17/2016    History of exertional chest pain    Personal history of other specified conditions     History of heartburn    Personal history of other specified conditions 09/02/2014    History of diarrhea    Personal history of other specified conditions     History of diarrhea    Personal history of other specified conditions 10/02/2015    History of vertigo    Personal history of other specified conditions 06/19/2015    History of dizziness    Personal history of other specified conditions 10/10/2016    History of memory loss    Personal history of other specified conditions 11/16/2021    History of weakness    Personal history of urinary (tract) infections 11/11/2015    History of urinary tract infection    Primary insomnia 10/01/2020    Insomnia, idiopathic    Pruritus ani 09/08/2021    Anal pruritus    Tinnitus, bilateral 10/02/2015    Tinnitus, bilateral    Unspecified symptoms and signs involving cognitive functions and awareness 09/05/2019    Cognitive complaints    Unspecified symptoms and signs involving the  genitourinary system 10/20/2020    UTI symptoms    Unspecified symptoms and signs involving the genitourinary system     UTI symptoms   [2]   Allergies  Allergen Reactions    Ciprocinonide Rash and Shortness of breath    Penicillins Itching, Rash, Hives and Shortness of breath    Augmentin [Amoxicillin-Pot Clavulanate] GI Upset    Cyclobenzaprine Other    Adhesive Itching    Ampicillin Itching and Rash     rash    Azatadine-Pseudoephedrine Itching and Rash    Ciprofloxacin Itching, Rash and Unknown    Erythromycin Itching and Rash    Guaifenesin Itching and Rash    Methocarbamol Itching, Rash, Unknown and Other    Shellfish Containing Products Rash    Sulfamethoxazole-Trimethoprim Rash    Trinalin Other and Itching   [3]   Past Surgical History:  Procedure Laterality Date    BLADDER SURGERY  05/31/2013    Bladder Surgery    BREAST SURGERY  05/31/2013    Breast Surgery    CATARACT EXTRACTION  10/02/2015    Cataract Surgery    GALLBLADDER SURGERY  05/31/2013    Gallbladder Surgery    KNEE SURGERY  10/02/2015    Knee Surgery

## 2025-07-31 ENCOUNTER — TELEPHONE (OUTPATIENT)
Dept: PRIMARY CARE | Facility: CLINIC | Age: 80
End: 2025-07-31
Payer: MEDICARE

## 2025-08-15 ENCOUNTER — TELEPHONE (OUTPATIENT)
Dept: PRIMARY CARE | Facility: CLINIC | Age: 80
End: 2025-08-15
Payer: MEDICARE

## 2025-08-15 DIAGNOSIS — I10 PRIMARY HYPERTENSION: ICD-10-CM

## 2025-08-18 RX ORDER — AMLODIPINE BESYLATE 2.5 MG/1
TABLET ORAL
Qty: 180 TABLET | Refills: 1 | Status: SHIPPED | OUTPATIENT
Start: 2025-08-18

## 2025-09-04 ENCOUNTER — APPOINTMENT (OUTPATIENT)
Dept: OTOLARYNGOLOGY | Facility: CLINIC | Age: 80
End: 2025-09-04
Payer: MEDICARE

## 2025-09-15 ENCOUNTER — APPOINTMENT (OUTPATIENT)
Dept: PRIMARY CARE | Facility: CLINIC | Age: 80
End: 2025-09-15
Payer: MEDICARE

## 2025-10-02 ENCOUNTER — APPOINTMENT (OUTPATIENT)
Dept: OTOLARYNGOLOGY | Facility: CLINIC | Age: 80
End: 2025-10-02
Payer: MEDICARE

## 2025-10-02 ENCOUNTER — APPOINTMENT (OUTPATIENT)
Dept: AUDIOLOGY | Facility: CLINIC | Age: 80
End: 2025-10-02
Payer: MEDICARE

## 2026-01-05 ENCOUNTER — APPOINTMENT (OUTPATIENT)
Dept: PRIMARY CARE | Facility: CLINIC | Age: 81
End: 2026-01-05
Payer: MEDICARE